# Patient Record
Sex: FEMALE | Employment: FULL TIME | URBAN - METROPOLITAN AREA
[De-identification: names, ages, dates, MRNs, and addresses within clinical notes are randomized per-mention and may not be internally consistent; named-entity substitution may affect disease eponyms.]

---

## 2024-08-26 ENCOUNTER — TELEPHONE (OUTPATIENT)
Dept: OBGYN | Facility: CLINIC | Age: 34
End: 2024-08-26

## 2024-08-26 NOTE — TELEPHONE ENCOUNTER
[facility]    Phone Message    May a detailed message be left on voicemail: yes     Reason for Call: Other: Pt scheduled initial prenatal visits. FRAN from [facility]. No prior c section. First available isnt till 10/8. PT only wanting seeing a MD. Please advise       Action Taken: Message routed to:  Other: ANDI HULL    Travel Screening: Not Applicable

## 2024-09-03 ENCOUNTER — TELEPHONE (OUTPATIENT)
Dept: OBGYN | Facility: CLINIC | Age: 34
End: 2024-09-03

## 2024-09-03 ENCOUNTER — VIRTUAL VISIT (OUTPATIENT)
Dept: OBGYN | Facility: CLINIC | Age: 34
End: 2024-09-03
Payer: COMMERCIAL

## 2024-09-03 VITALS — HEIGHT: 62 IN

## 2024-09-03 DIAGNOSIS — Z83.3 FAMILY HISTORY OF DIABETES MELLITUS: ICD-10-CM

## 2024-09-03 DIAGNOSIS — Z23 NEED FOR DIPHTHERIA-TETANUS-PERTUSSIS (TDAP) VACCINE: ICD-10-CM

## 2024-09-03 DIAGNOSIS — O09.519 ENCOUNTER FOR SUPERVISION OF PRIMIGRAVIDA OF ADVANCED MATERNAL AGE: Primary | ICD-10-CM

## 2024-09-03 PROBLEM — G89.29 CHRONIC PAIN OF RIGHT KNEE: Status: RESOLVED | Noted: 2023-07-26 | Resolved: 2024-09-03

## 2024-09-03 PROBLEM — N94.6 DYSMENORRHEA: Status: ACTIVE | Noted: 2022-10-13

## 2024-09-03 PROBLEM — M25.561 CHRONIC PAIN OF RIGHT KNEE: Status: ACTIVE | Noted: 2023-07-26

## 2024-09-03 PROBLEM — N94.6 DYSMENORRHEA: Status: RESOLVED | Noted: 2022-10-13 | Resolved: 2024-09-03

## 2024-09-03 PROBLEM — G89.29 CHRONIC PAIN OF RIGHT KNEE: Status: ACTIVE | Noted: 2023-07-26

## 2024-09-03 PROBLEM — M25.561 CHRONIC PAIN OF RIGHT KNEE: Status: RESOLVED | Noted: 2023-07-26 | Resolved: 2024-09-03

## 2024-09-03 PROCEDURE — 99207 PR NO CHARGE NURSE ONLY: CPT | Mod: 93

## 2024-09-03 RX ORDER — KETOCONAZOLE 20 MG/ML
SHAMPOO TOPICAL
COMMUNITY
Start: 2023-06-26

## 2024-09-03 RX ORDER — VITAMIN A ACETATE, BETA CAROTENE, ASCORBIC ACID, CHOLECALCIFEROL, .ALPHA.-TOCOPHEROL ACETATE, DL-, THIAMINE MONONITRATE, RIBOFLAVIN, NIACINAMIDE, PYRIDOXINE HYDROCHLORIDE, FOLIC ACID, CYANOCOBALAMIN, CALCIUM CARBONATE, FERROUS FUMARATE, ZINC OXIDE, CUPRIC OXIDE 3080; 12; 120; 400; 1; 1.84; 3; 20; 22; 920; 25; 200; 27; 10; 2 [IU]/1; UG/1; MG/1; [IU]/1; MG/1; MG/1; MG/1; MG/1; MG/1; [IU]/1; MG/1; MG/1; MG/1; MG/1; MG/1
1 TABLET, FILM COATED ORAL DAILY
COMMUNITY

## 2024-09-03 NOTE — TELEPHONE ENCOUNTER
[facility]    Phone Message    May a detailed message be left on voicemail: yes     Reason for Call: Other: Pt is wanting to cancel her appt on 9/23 as she is going to [facility] for the initial OB Appt, per protocol she is still needing that appt. Pt is also wanting to get set up to tour the [facility]? Are we able to do so. Please advise.      Action Taken: Other: [facility]    Travel Screening: Not Applicable     Date of Service: 9/03/24

## 2024-09-03 NOTE — PROGRESS NOTES
Important Information for Provider:     New ob nurse intake by phone, first pregnancy,AMA. Patient is a transfer from [facility], she is up to date with her prenatal care. Ultrasound performed 8/12/2024, comparable to LMP.  Genetic screening was done last week( results are not in the chart yet). ANNE MARIE with VALERIO 9/23/2024         Prenatal OB Questionnaire  Patient supplied answers from flow sheet for:  Prenatal OB Questionnaire.  Past Medical History  Have you ever received care for your mental health? : No  Have you ever been in a major accident or suffered serious trauma?: No  Within the last year, has anyone hit, slapped, kicked or otherwise hurt you?: No  In the last year, has anyone forced you to have sex when you didn't want to?: No    Past Medical History 2   Have you ever received a blood transfusion?: No  Would you accept a blood transfusion if was medically recommended?: Yes  Does anyone in your home smoke?: No   Is your blood type Rh negative?: No  Have you ever ?: No  Have you been hospitalized for a nonsurgical reason excluding normal delivery?: No  Have you ever had an abnormal pap smear?: No    Past Medical History (Continued)  Do you have a history of abnormalities of the uterus?: No  Did your mother take WILL or any other hormones when she was pregnant with you?: No  Do you have any other problems we have not asked about which you feel may be important to this pregnancy?: No                     Allergies as of 9/3/2024:    Allergies as of 09/03/2024 - never reviewed   Allergen Reaction Noted    Erythromycin Nausea and Vomiting, Hives, Other (See Comments), and Rash 02/13/2018    Acetaminophen Other (See Comments), Nausea, and Palpitations 05/23/2017       Current medications are:  Current Outpatient Medications   Medication Sig Dispense Refill    ketoconazole (NIZORAL) 2 % external shampoo Apply to scalp and rinse off in 5 minutes. Use 2-3 times a week.      Prenatal Vit-Fe Fumarate-FA  (PRENATAL PLUS) 27-1 MG TABS Take 1 tablet by mouth daily.           Early ultrasound screening tool:    Does patient have irregular periods?  No  Did patient use hormonal birth control in the three months prior to positive urine pregnancy test? No  Is the patient breastfeeding?  No  Is the patient 10 weeks or greater at time of education visit?  No

## 2024-09-05 NOTE — TELEPHONE ENCOUNTER
TOBY 9/5 SH    Per protocol, even if pt is doing her first appointment at another clinic, her first visit with us needs to be the length of a first prenatal. If she is already scheduled to see [facility] for her first visit, we would need the records from her care there transferred to us (fax number [phone]).    If pt would still like to cancel her visit on 9/23, there is a spot on hold for 30 minutes with SK on the day she is currently scheduled to see her if it works. Otherwise, please help pt find the first 30 minute appt in that week that works for her.

## 2024-09-10 NOTE — TELEPHONE ENCOUNTER
Called pt, who is okay with doing the 9/23 appt for 40 minutes. Records in Care Everywhere, but gave fax number just in case. 9/10 SH

## 2024-09-23 ENCOUNTER — TRANSCRIBE ORDERS (OUTPATIENT)
Dept: MATERNAL FETAL MEDICINE | Facility: CLINIC | Age: 34
End: 2024-09-23

## 2024-09-23 ENCOUNTER — PRENATAL OFFICE VISIT (OUTPATIENT)
Dept: MIDWIFE SERVICES | Facility: CLINIC | Age: 34
End: 2024-09-23
Payer: COMMERCIAL

## 2024-09-23 VITALS
OXYGEN SATURATION: 99 % | HEART RATE: 104 BPM | WEIGHT: 101.7 LBS | DIASTOLIC BLOOD PRESSURE: 75 MMHG | SYSTOLIC BLOOD PRESSURE: 109 MMHG | BODY MASS INDEX: 18.6 KG/M2

## 2024-09-23 DIAGNOSIS — O09.511 AMA (ADVANCED MATERNAL AGE) PRIMIGRAVIDA 35+, FIRST TRIMESTER: Primary | ICD-10-CM

## 2024-09-23 DIAGNOSIS — O26.90 PREGNANCY RELATED CONDITION, ANTEPARTUM: Primary | ICD-10-CM

## 2024-09-23 PROCEDURE — 99203 OFFICE O/P NEW LOW 30 MIN: CPT | Performed by: ADVANCED PRACTICE MIDWIFE

## 2024-09-23 ASSESSMENT — EDINBURGH POSTNATAL DEPRESSION SCALE (EPDS)
THINGS HAVE BEEN GETTING ON TOP OF ME: NO, I HAVE BEEN COPING AS WELL AS EVER
TOTAL SCORE: 0
I HAVE BEEN SO UNHAPPY THAT I HAVE HAD DIFFICULTY SLEEPING: NOT AT ALL
I HAVE LOOKED FORWARD WITH ENJOYMENT TO THINGS: AS MUCH AS I EVER DID
THE THOUGHT OF HARMING MYSELF HAS OCCURRED TO ME: NEVER
I HAVE BLAMED MYSELF UNNECESSARILY WHEN THINGS WENT WRONG: NO, NEVER
I HAVE BEEN SO UNHAPPY THAT I HAVE BEEN CRYING: NO, NEVER
I HAVE BEEN ANXIOUS OR WORRIED FOR NO GOOD REASON: NO, NOT AT ALL
I HAVE BEEN ABLE TO LAUGH AND SEE THE FUNNY SIDE OF THINGS: AS MUCH AS I ALWAYS COULD
I HAVE FELT SCARED OR PANICKY FOR NO GOOD REASON: NO, NOT AT ALL
I HAVE FELT SAD OR MISERABLE: NO, NOT AT ALL

## 2024-09-23 NOTE — PROGRESS NOTES
is a partnered  1 para 0 0 0 0  with an EDC by ultrasound of 3/28/25 who presents for a new OB Visit.   She has not had bleeding since her LMP.  She has had mild nausea.. Weigh loss   has not occurred.. She denies fever, chills and viral infections since her last LMP.  There is moderatefatigue. She is not a previous CNM patient.    =========================================    INFECTION HISTORY  HIV: no  Hepatitis B: no  Hepatitis C: no  Tuberculosis: no    Herpes self: no  Herpes partner:  no   Chlamydia: no, Neg testing at [facility]  Gonorrhea: no, Neg testing at [facility]   Trichomonis: no  Syphilis:  no  ============================================    Low Dose Aspirin for Preeclampsia Prevention  Assessment:    Consider for those with 1 high risk or 2  moderate risk factors         High risk: previous pregnancy with preeclampsia, multifetal gestation, chronic htn, diabetes, chronic kidney disease, autoimmune disorder         Medium risk: nulliparity, BMI >30, family hx preeclampsia, age >/= 35,  race, low SES, personal risk factors (hx low birth weight, hx stillbirth, >10yrs between pregnancies).         Patient does qualify for low dose aspirin therapy    PERSONAL/SOCIAL HISTORY  Lives lives with their family.  Employment: Full time.=============================================    REVIEW OF SYSTEMS  CONSTITUTIONAL: Denies fever, chills and night sweats  DIET: Appetite is decrease.  Eats a regular.   Plans to gain 25-35 pounds with her pregnancy.  SKIN: Denies changes and suspicious moles or lesions.  ENT: Denies blurred vision, hearing loss, tinnitus, frequent colds, epistaxis, hoarseness and tooth pain.    ENDOCRINE: Denies heat and cold intolerance, and polydypsia.    BREASTS:  Tender since LMP.  Denies discharge and lumps.   HEART/LUNGS: Denies dyspnea, wheezing, coughing and chest pain.  HEMATOLOGIC: Denies tendency to bruise and history of blood clots.  GASTROINTESTINAL: Denies heartburn,  indigestion and change of color in stools.    GENITOURINARY:  Denies urgency, dysuria and hematuria.  Has frequency of urination since LMP.   NEUROLOGIC:  Denies seizures, weakness and fainting.  PSYCHIATRIC:  Denies mood changes  ===========================================    PHYSICAL EXAM  GENERAL:   pleasant pregnant female, alert, cooperative  and well groomed.  SKIN:  Warm and dry, without lesions or tenderness.    HEAD: Symmetrical features.  EYES:  PERRLAEARS: Tympanic membranes gray, translucent and intact.  NOSE: No flaring, patent  MOUTH:  Buccal mucosa pink, moist without lesions.  Teeth in good repair.    NECK:  Thyroid without enlargement and nodules.  Lymph nodes not palpable.   LUNGS:  Clear to auscultation.  BREAST:  HEART:  RRR without murmur.  ABDOMEN: Soft without masses or tenderness.  No CVA tenderness.  Uterus palpable at size equal to dates. MUSCULOSKELETAL:  Full range of motion  GENITALIA: EXTREMITIES:  2+/2+ DTR, No edema. No significant varicosities.  ===================================================    PLAN:  Instructed on use of triage nurse line and contacting the on call CNM after hours in an emergency.    Discussed the indications, uses for and false positives for genetic testing and fetal survey ultrasounds at 18-20 weeks gestation. She had NIPT at previous provider.  [facility] US explained and ordered due to AMA.  Daily low dose ASA recommended.  Flu shot recommended and declined today.    Will return to the clinic in 4 weeks for her next routine prenatal check.  Will call to be seen sooner if problem arise.  Oriented to CNM Service and added to list.        Adelita Diez, DNP, APRN, CNM

## 2024-10-06 ENCOUNTER — HEALTH MAINTENANCE LETTER (OUTPATIENT)
Age: 34
End: 2024-10-06

## 2024-10-31 ENCOUNTER — PRE VISIT (OUTPATIENT)
Dept: MATERNAL FETAL MEDICINE | Facility: CLINIC | Age: 34
End: 2024-10-31
Payer: COMMERCIAL

## 2024-11-06 ENCOUNTER — HOSPITAL ENCOUNTER (OUTPATIENT)
Dept: ULTRASOUND IMAGING | Facility: CLINIC | Age: 34
Discharge: HOME | End: 2024-11-06
Attending: OBSTETRICS & GYNECOLOGY
Payer: COMMERCIAL

## 2024-11-06 ENCOUNTER — OFFICE VISIT (OUTPATIENT)
Dept: MATERNAL FETAL MEDICINE | Facility: CLINIC | Age: 34
End: 2024-11-06
Attending: OBSTETRICS & GYNECOLOGY
Payer: COMMERCIAL

## 2024-11-06 DIAGNOSIS — O26.90 PREGNANCY RELATED CONDITION, ANTEPARTUM: ICD-10-CM

## 2024-11-06 DIAGNOSIS — O09.512 PRIMIGRAVIDA OF ADVANCED MATERNAL AGE IN SECOND TRIMESTER: Primary | ICD-10-CM

## 2024-11-06 PROCEDURE — 99204 OFFICE O/P NEW MOD 45 MIN: CPT | Mod: 25 | Performed by: OBSTETRICS & GYNECOLOGY

## 2024-11-06 PROCEDURE — 76811 OB US DETAILED SNGL FETUS: CPT

## 2024-11-06 PROCEDURE — 76811 OB US DETAILED SNGL FETUS: CPT | Mod: 26 | Performed by: OBSTETRICS & GYNECOLOGY

## 2024-11-06 NOTE — NURSING NOTE
Patient reports positive fetal movement, no pain, no contractions, leaking of fluid, or bleeding.  SBAR given to DAMIAN ESCOBAR, see their note in Epic.

## 2024-11-06 NOTE — PROGRESS NOTES
The patient was seen for an ultrasound in the M[facility]Fetal Medicine Center at the [facility] today.  For a detailed report of the ultrasound examination, please see the ultrasound report which can be found under the imaging tab.    If you have questions regarding today's evaluation or if we can be of further service, please contact the Maternal-Fetal Medicine Center.    Rasheeda Carranza MD  , OB/GYN  Maternal-Fetal Medicine  [phone] (Pager)

## 2024-11-11 ENCOUNTER — PRENATAL OFFICE VISIT (OUTPATIENT)
Dept: OBGYN | Facility: CLINIC | Age: 34
End: 2024-11-11
Payer: COMMERCIAL

## 2024-11-11 VITALS
DIASTOLIC BLOOD PRESSURE: 57 MMHG | TEMPERATURE: 97.6 F | HEART RATE: 86 BPM | RESPIRATION RATE: 16 BRPM | WEIGHT: 104.6 LBS | OXYGEN SATURATION: 100 % | BODY MASS INDEX: 19.13 KG/M2 | SYSTOLIC BLOOD PRESSURE: 105 MMHG

## 2024-11-11 DIAGNOSIS — O09.512 ENCOUNTER FOR SUPERVISION OF PRIMIGRAVIDA OF ADVANCED MATERNAL AGE IN SECOND TRIMESTER: Primary | ICD-10-CM

## 2024-11-11 PROCEDURE — 99207 PR PRENATAL VISIT: CPT | Performed by: OBSTETRICS & GYNECOLOGY

## 2024-11-11 RX ORDER — ASPIRIN 81 MG/1
81 TABLET ORAL DAILY
Qty: 100 TABLET | Refills: 4 | Status: SHIPPED | OUTPATIENT
Start: 2024-11-11

## 2024-11-11 RX ORDER — FAMOTIDINE 20 MG/1
20 TABLET, FILM COATED ORAL
COMMUNITY
Start: 2024-10-04 | End: 2025-02-01

## 2024-11-11 RX ORDER — METRONIDAZOLE 500 MG/1
TABLET ORAL
COMMUNITY
Start: 2024-10-03

## 2024-11-26 ENCOUNTER — MYC MEDICAL ADVICE (OUTPATIENT)
Dept: OBGYN | Facility: CLINIC | Age: 34
End: 2024-11-26
Payer: COMMERCIAL

## 2024-12-02 NOTE — TELEPHONE ENCOUNTER
Pt called - discussed with pt to either call primary to be assessed - pain in mid back sound skeletal muscular.  She could also be experiencing indigestion and to start taking her her acid reflux medication as prescribed.  Pt verbalized understanding and agreed to the plan.

## 2024-12-12 ENCOUNTER — VIRTUAL VISIT (OUTPATIENT)
Dept: OBGYN | Facility: CLINIC | Age: 34
End: 2024-12-12
Attending: ADVANCED PRACTICE MIDWIFE
Payer: COMMERCIAL

## 2024-12-12 DIAGNOSIS — O09.512 ENCOUNTER FOR SUPERVISION OF PRIMIGRAVIDA OF ADVANCED MATERNAL AGE IN SECOND TRIMESTER: Primary | ICD-10-CM

## 2024-12-12 LAB
ABO + RH BLD: NORMAL
BLD GP AB SCN SERPL QL: NEGATIVE
SPECIMEN EXP DATE BLD: NORMAL

## 2024-12-12 RX ORDER — VITAMIN B COMPLEX
1 TABLET ORAL DAILY
COMMUNITY

## 2024-12-12 RX ORDER — VITAMIN E (DL,TOCOPHERYL ACET) 180 MG
1 CAPSULE ORAL DAILY
COMMUNITY

## 2024-12-12 ASSESSMENT — EDINBURGH POSTNATAL DEPRESSION SCALE (EPDS)
I HAVE BEEN SO UNHAPPY THAT I HAVE BEEN CRYING: ONLY OCCASIONALLY
I HAVE BLAMED MYSELF UNNECESSARILY WHEN THINGS WENT WRONG: YES, SOME OF THE TIME
I HAVE BEEN ABLE TO LAUGH AND SEE THE FUNNY SIDE OF THINGS: AS MUCH AS I ALWAYS COULD
I HAVE FELT SAD OR MISERABLE: NOT VERY OFTEN
I HAVE BEEN SO UNHAPPY THAT I HAVE HAD DIFFICULTY SLEEPING: NOT VERY OFTEN
THE THOUGHT OF HARMING MYSELF HAS OCCURRED TO ME: NEVER
I HAVE LOOKED FORWARD WITH ENJOYMENT TO THINGS: AS MUCH AS I EVER DID
I HAVE BEEN ANXIOUS OR WORRIED FOR NO GOOD REASON: YES, SOMETIMES
THINGS HAVE BEEN GETTING ON TOP OF ME: NO, MOST OF THE TIME I HAVE COPED QUITE WELL
I HAVE FELT SCARED OR PANICKY FOR NO GOOD REASON: YES, SOMETIMES
TOTAL SCORE: 10

## 2024-12-12 NOTE — PROGRESS NOTES
[facility] RN Transfer of Care Intake note    34 year old female newly pregnant.  LMP: 24.    exact and regular cycles  Pregnancy is unplanned but welcomed.    [facility] floor and has had 8 prenatal visits with this clinic. Gestational age at first OB visit with this pregnancy was 7th week    The reason the patient is transferring care is: Moved closer to [place].    Partner/support person name and relationship - Herber, partner.        Symptoms since LMP include constipation. Patient has tried these relief   measures: increased fluids.      OB HISTORY  OB History    Para Term  AB Living   1 0 0 0 0 0   SAB IAB Ectopic Multiple Live Births   0 0 0 0 0      # Outcome Date GA Lbr Uday/2nd Weight Sex Type Anes PTL Lv   1 Current               Obstetric Comments   *First Pregnancy           OB COMPLICATIONS  *First Pregnancy       PERSONAL/SOCIAL HISTORY    lives with their spouse.  Employment: Full time as a coordinator.  Job involves light activity .  Her partner works as a consultant.     MENTAL HEALTH HISTORY:  denies      - Current Medications    Current Outpatient Medications   Medication Sig Dispense Refill    aspirin 81 MG EC tablet Take 1 tablet (81 mg) by mouth daily. 100 tablet 4    Magnesium Oxide -Mg Supplement 500 MG CAPS Take 1 tablet by mouth daily.      Prenatal Vit-Fe Fumarate-FA (PRENATAL PLUS) 27-1 MG TABS Take 1 tablet by mouth daily.      Vitamin D3 (VITAMIN D, CHOLECALCIFEROL,) 25 mcg (1000 units) tablet Take 1 tablet by mouth daily.      famotidine (PEPCID) 20 MG tablet Take 20 mg by mouth. (Patient not taking: Reported on 2024)      metroNIDAZOLE (FLAGYL) 500 MG tablet  (Patient not taking: Reported on 2024)             - Co-morbids  History reviewed. No pertinent past medical history.    - Pre pregnancy weight 103lbs  pre pregnancy BMI     - Genetic/Infection questionnaire completed, risks include none. Discussed  genetic screening options, patient does desire, did screen, WNL first trimester genetic screening  Pt  does not have a recent known exposure to Parvo or CMV so IgG/IgM testing WILL NOT be ordered.   - Labs already drawn this pregnancy include: all standard labs  - Ultrasound done at 20 wks weeks gestation on 11/6/24  Flu Vaccine.  Wants vaccine  COVID Vaccine: Hx of COVID illness  (details below), received vaccines  RHogam: no Rhogam, O+  Tdap: wants TDAP  - Discussed expectations for routine prenatal care and scheduling.  - Discussed highlights from The Expectant Family booklet on warning signs, safe pregnancy and prevention of infections diseases, nutrition and exercise.  - Patient was encouraged to start prenatal vitamins as tolerated, if experiencing nausea and vomiting then OK to switch to folic acid only at this time.    - Additional items: Has been given information regarding WIC  Was given the phone number for [facility]  - Reconciled and reviewed problem list  - Pt scheduled for NOB on 12/13/24 at 10:40 am with VALERIO Tadeo RN

## 2024-12-12 NOTE — PATIENT INSTRUCTIONS
"Weeks 22 to 26 of Your Pregnancy: Care Instructions  Your baby's lungs are getting ready for breathing. Your baby may respond to your voice. Your baby likely turns less, and kicks or jerks more. Jerking may mean that your baby has hiccups.    Think about taking childbirth classes. And start to think about whether you want to have pain medicine during labor.   At your next doctor visit, you may be tested for anemia and for high blood sugar that first occurs during pregnancy (gestational diabetes). These conditions can cause problems for you and your baby.         To ease discomfort, such as back pain   Change your position often. Try not to sit or stand for too long.  Get some exercise. Things like walking or stretching may help.  Try using a heating pad or cold pack.        To ease or reduce swelling in your feet, ankles, hands, and fingers   Take off your rings.  Avoid high-sodium foods, such as potato chips.  Prop up your feet, and sleep with pillows under your feet.  Try to avoid standing for long periods of time.  Do not wear tight shoes.  Wear support stockings.  Kegel exercises to prevent urine from leaking    Squeeze your muscles as if you were trying not to pass gas. Your belly, legs, and buttocks shouldn't move. Hold the squeeze for 3 seconds, then relax for 5 to 10 seconds.    Add 1 second each week until you can squeeze for 10 seconds. Repeat the exercise 10 times a session. Do 3 to 8 sessions a day. If these exercises cause you pain, stop doing them and talk with your doctor.  Follow-up care is a key part of your treatment and safety. Be sure to make and go to all appointments, and call your doctor if you are having problems. It's also a good idea to know your test results and keep a list of the medicines you take.  Where can you learn more?  Go to https://www.healthwise.net/patiented  Enter G264 in the search box to learn more about \"Weeks 22 to 26 of Your Pregnancy: Care Instructions.\"  Current as of: " July 10, 2023  Content Version: 14.2 2024 [email or web address] [employer].   Care instructions adapted under license by your healthcare professional. If you have questions about a medical condition or this instruction, always ask your healthcare professional. Healthwise, Incorporated disclaims any warranty or liability for your use of this information.    Learning About Screening for Gestational Diabetes  What is gestational diabetes screening?     Screening for gestational diabetes is a way to look for high blood sugar during pregnancy. You drink some very sweet liquid. Then you have a blood test to see how your body uses sugar (glucose).  How is gestational diabetes screening done?  Screening for gestational diabetes may be done in a couple of ways.  Two-part screening.  Part one (glucose challenge test): A blood sample is taken after you drink a liquid that contains sugar (glucose). You don't need to stop eating or drinking before this test. If the test shows that you don't have a lot of sugar in your blood, you don't have gestational diabetes.  Part two (oral glucose tolerance test, or OGTT): If the first test shows a lot of sugar in your blood, then you may have an OGTT. You can't eat or drink for at least 8 hours before this test. A blood sample is taken, then you drink a sweet liquid. You have more blood tests after 1 to 3 hours. If the OGTT shows that you have a lot of sugar in your blood, you may have gestational diabetes.  One-part screening.  Sometimes doctors use the OGTT on its own. If the test shows that you don't have a lot of sugar in your blood, you don't have gestational diabetes. If you do have a lot of sugar in your blood, you may have the condition.  What are the risks of screening?  Your blood glucose level may drop very low toward the end of the test. If this happens, you may feel weak, hungry, and restless. Tell your doctor if you have these symptoms. The test usually will be stopped.  You may  "vomit after drinking the sweet liquid. If this happens, you may need to take the test at a later time.  Your doctor may do more glucose tests at other times during your pregnancy.  Follow-up care is a key part of your treatment and safety. Be sure to make and go to all appointments, and call your doctor if you are having problems. It's also a good idea to know your test results and keep a list of the medicines you take.  Where can you learn more?  Go to https://www.Science.net/patiented  Enter A472 in the search box to learn more about \"Learning About Screening for Gestational Diabetes.\"  Current as of: 2023  Content Version: 2024 [employer].   Care instructions adapted under license by your healthcare professional. If you have questions about a medical condition or this instruction, always ask your healthcare professional. Healthwise, Incorporated disclaims any warranty or liability for your use of this information.    You have been provided the My Labor and Birth Wishes document.  Please review at home and bring to your next prenatal visit. Bring this sheet to the hospital for your birth. Give copies to your care team members and support person.   Additional copies can be found here:  [email or web address]  Weeks 26 to 30 of Your Pregnancy: Care Instructions  You're starting your last trimester. You'll probably feel your baby moving around more. Your back may ache as your body gets used to your baby's size and length. Take care of yourself, and pay attention to what your body needs.    Talk to your doctor about getting the Tdap shot. It will help protect your  against whooping cough (pertussis). Also ask your doctor about flu and COVID-19 shots if you haven't had them yet. If your blood type is Rh negative, you may be given a shot of Rh immune globulin (such as RhoGAM). It can help prevent problems for your baby.   You may have Manuel-Levin contractions. They are " "single or several strong contractions without a pattern. These are practice contractions but not the start of labor.   Be kind to yourself.       Take breaks when you're tired.  Change positions often. Don't sit for too long or stand for too long.  At work, rest during breaks if you can. If you don't get breaks, talk to your doctor about writing a letter to your employer to request them.  Avoid fumes, chemicals, and tobacco smoke.  Be sexual if you want to.       You may be interested in sex, or you may not. Everyone is different.  Sex is okay unless your doctor tells you not to.  Your belly can make it hard to find good positions for sex. [place] and explore.  Watch for signs of  labor.        These signs include:  Menstrual-like cramps. Or you may have pain or pressure in your pelvis that happens in a pattern.  About 6 or more contractions in an hour (even after rest and a glass of water).  A low, dull backache that doesn't go away when you change positions.  An increase or change in vaginal discharge.  Light vaginal bleeding or spotting.  Your water breaking.  Know what to do if you think you are having contractions.       Drink 1 or 2 glasses of water.  Lie down on your left side for at least an hour.  While on your side, feel the top of your belly to see if it's tight.  Write down your contractions for an hour. Time how long it is from the start of one contraction to the start of the next.  Call your doctor if you have regular contractions.  Follow-up care is a key part of your treatment and safety. Be sure to make and go to all appointments, and call your doctor if you are having problems. It's also a good idea to know your test results and keep a list of the medicines you take.  Where can you learn more?  Go to https://www.healthwise.net/patiented  Enter S999 in the search box to learn more about \"Weeks 26 to 30 of Your Pregnancy: Care Instructions.\"  Current as of: July 10, 2023  Content Version: " 14.2    2024 [facility] [employer].   Care instructions adapted under license by your healthcare professional. If you have questions about a medical condition or this instruction, always ask your healthcare professional. Healthwise, Incorporated disclaims any warranty or liability for your use of this information.    Counting Your Baby's Kicks: Care Instructions  Overview     Counting your baby's kicks is one way your doctor can tell that your baby is healthy. You will probably feel your baby move for the first time between 16 and 22 weeks. The movement may feel like flutters rather than kicks. Your baby may move more at certain times of the day. When you are active, you may notice less kicking than when you are resting. At your prenatal visits, your doctor will ask whether the baby is active.  In your last trimester, your doctor may ask you to count the number of times you feel your baby move.  Follow-up care is a key part of your treatment and safety. Be sure to make and go to all appointments, and call your doctor if you are having problems. It's also a good idea to know your test results and keep a list of the medicines you take.  How do you count fetal kicks?  A common method of checking your baby's movement is to note the length of time it takes to count 10 movements (such as kicks, flutters, or rolls).  Pick your baby's most active time of day to count. This may be any time from morning to evening.  If you don't feel 10 movements in an hour, have something to eat or drink and count for another hour. If you don't feel at least 10 movements in the 2-hour period, call your doctor.  Do not use an at-home Doppler heart monitor in place of counting fetal movements.  When should you call for help?   Call your doctor now or seek immediate medical care if:    You feel fewer than 10 movements in a 2-hour period.     You noticed that your baby has stopped moving or is moving less than normal.   Watch closely for  "changes in your health, and be sure to contact your doctor if you have any problems.  Where can you learn more?  Go to https://www.[employer].net/patiented  Enter U048 in the search box to learn more about \"Counting Your Baby's Kicks: Care Instructions.\"  Current as of: July 10, 2023  Content Version: 14.2 2024 [facility] [employer].   Care instructions adapted under license by your healthcare professional. If you have questions about a medical condition or this instruction, always ask your healthcare professional. Healthwise, Incorporated disclaims any warranty or liability for your use of this information.      "

## 2024-12-12 NOTE — PATIENT INSTRUCTIONS
Thank you for trusting us with your care!     Please be aware, if you are on Mychart, you may see your results prior to your providers review. If labs are abnormal, we will call or message you on Mychart with a follow up plan.    If you need to contact us for questions about:  Symptoms, Scheduling & Medical Questions;   -Non-urgent (2-3 day response) Mychart message   -Urgent (needing response today) [phone] (if after 3:30pm next day response)     Prescriptions: Please call your Pharmacy   Billing: Catherine [phone]       or                           [facility]:[phone]           [facility] NUMBER: [phone]     APPOINTMENTS:  8-28 weeks every 4 weeks  28-36 weeks every 2 weeks  36-Delivery every week    ULTRASOUNDS AND TESTS:  You will have an 18-20 week ultrasound to check the growth and anatomy of your baby. Additional ultrasounds will be done if medically indicated.         The [place] Cold:  Rest and drink plenty of fluids.  A vaporizer may help.    For aches and fever  Acetaminophen (Tylenol)    For Sore Throat  Gargle with warm salt water. Suck on hard candy, ice or popsicles. Eat soft, soothing foods. Drink cool or warm liquids.  You may also take:    Cough drops, such as Halls, Ricola, Cepacol or Chloraseptic.  Acetaminophen    For Cough  Avoid products that contain alcohol.  You may take:    Cough drops, such as Halls, Ricola, Cepacol or Chloraseptic.  Guaifenesin (Mucinex, plain Robitussin) for dry cough.   Dextromethorphan (plain Robitussin, Delsym) to suppress a cough.    For nasal congestion (stuffy head)  You may take:  Saline nasal spray  Afrin nasal spray. Do not use this for more than 3 or 4 days.  Pseudoephedrine (plain Sudafed). Use with caution and only after consulting your care provider.  Do not use this if you have high blood pressure.    Allergies (such as runny nose or sneezing)  Diphenhydramine (plain Benadryl)  Chlorpheniramine (Chlor-Trimeton)  Second  generation antihistamines such as Claritin (loratadine), or Zyrtec (cetirizine).    Flu (influenza) prevention  Pregnant women should be vaccinated early in the flu season (October through May) as soon as the vaccine is available regardless how far along you are in your pregnancy, (Do not use the FluMist nasal inhalation).    Headaches, pain and inflammation  Do not take ibuprofen (Advil or Motrin), naproxen sodium (Aleve), aspirin or salicylates without first speaking with your provider.  These may not be safe during all stages or pregnancy.  Instead, you may use:  Acetaminophen (Tylenol).  If Tylenol does not help your headache, call your care provider.  This could be a sign of high blood pressure.    Constipation  (hard dry stools that are hard to pass)    Eat more fiber such as whole grains, fruits, and vegetables. Drink 10-12 glasses of fluids each day. Avoid caffeine.  You may also take:  Docusate sodium (Colace) to soften your stools. This takes a day or two to have an effect.  Metamucil (plain), Effersyllium, Citrucel, or Fibercon to increase the fiber in your diet. This takes a day or two to have an effect  Miralax. This may take a day or two to have an effect.  Senna (Senokot) or bisacodyl (Dulcolax). This will produce a bowel movement soon after use. Do not use this regularly  Glycerin suppository. This will produce a bowel movement soon after use.  Milk of Magnesia for easier bowel movements. This works overnight. Do not use it regularly.     Nausea in Pregnancy      -Small frequent meals, dry crackers/toast before rising, avoid fatty foods, increase protein intake, avoid triggers (smells), increase sleep, jose (tea, chewable candy, tablet). If you're taking a prenatal vitamin and feel like that could be causing some nausea, you could switch to Folic Acid only.      -If the above are not helpful:         * you can take Vitamin B6 (Pyridoxine). Take 10-25 mg every 8 hours.          * you can take  Doxylamine (Unisom tablets) (NOT sleep gels). Take 12.5 mg every 4 hours. You may take            25 mg at bedtime        Please be seen in the Emergency Department if:     -unable to keep fluids down x24 hours or more  -no urine output for 8 or more hours during waking hours  -uterine cramping  -severe abdominal pain OR bloody diarrhea   -signs/symptoms of dehydration (tacky mucous membranes, dizzy/lightheaded, increase heart rate, shortness of breath)    WIC is a nutrition and breastfeeding program that helps young families eat well and be healthy by assisting with obtaining food and teaching about nutritious foods during/after pregnancy.  Here's where you can find more information about WIC and apply if interested: [email or web address]      [employer] provides education during all three trimesters of pregnancy, reviewing the stages of prenatal development so parents know what to expect and feel empowered in their journey. They also continue support after birth, with [employer] Educators modeling techniques for playing and learning. Here's the link: https://Velti/enroll/      CONTACT THE CLINIC IF YOU HAVE:  -Temperature of 100.4 or above  -Irritating vaginal discharge (increase in non-irritating discharge is normal)  -Vaginal bleeding/severe pain  -Bladder infection (burning with urination, frequent urination, blood in urine)  -Headaches not relieved with rest, hydration, and/or Tylenol  -Decrease in baby's activity. Kick counts <28 weeks   -Any accident resulting in trauma (injury) to you-especially abdominal trauma  -Symptoms of pre-term labor      Weeks 22 to 26 of Your Pregnancy: Care Instructions  Your baby's lungs are getting ready for breathing. Your baby may respond to your voice. Your baby likely turns less, and kicks or jerks more. Jerking may mean that your baby has hiccups.    Think about taking childbirth classes. And start to think about whether you want to  "have pain medicine during labor.   At your next doctor visit, you may be tested for anemia and for high blood sugar that first occurs during pregnancy (gestational diabetes). These conditions can cause problems for you and your baby.         To ease discomfort, such as back pain   Change your position often. Try not to sit or stand for too long.  Get some exercise. Things like walking or stretching may help.  Try using a heating pad or cold pack.        To ease or reduce swelling in your feet, ankles, hands, and fingers   Take off your rings.  Avoid high-sodium foods, such as potato chips.  Prop up your feet, and sleep with pillows under your feet.  Try to avoid standing for long periods of time.  Do not wear tight shoes.  Wear support stockings.  Kegel exercises to prevent urine from leaking    Squeeze your muscles as if you were trying not to pass gas. Your belly, legs, and buttocks shouldn't move. Hold the squeeze for 3 seconds, then relax for 5 to 10 seconds.    Add 1 second each week until you can squeeze for 10 seconds. Repeat the exercise 10 times a session. Do 3 to 8 sessions a day. If these exercises cause you pain, stop doing them and talk with your doctor.  Follow-up care is a key part of your treatment and safety. Be sure to make and go to all appointments, and call your doctor if you are having problems. It's also a good idea to know your test results and keep a list of the medicines you take.  Where can you learn more?  Go to https://www.Zipfit.net/patiented  Enter G264 in the search box to learn more about \"Weeks 22 to 26 of Your Pregnancy: Care Instructions.\"  Current as of: July 10, 2023  Content Version: 14.2 2024 [facility] [employer].   Care instructions adapted under license by your healthcare professional. If you have questions about a medical condition or this instruction, always ask your healthcare professional. Healthwise[employer] Incorporated disclaims any warranty or liability for your use " of this information.    Learning About Screening for Gestational Diabetes  What is gestational diabetes screening?     Screening for gestational diabetes is a way to look for high blood sugar during pregnancy. You drink some very sweet liquid. Then you have a blood test to see how your body uses sugar (glucose).  How is gestational diabetes screening done?  Screening for gestational diabetes may be done in a couple of ways.  Two-part screening.  Part one (glucose challenge test): A blood sample is taken after you drink a liquid that contains sugar (glucose). You don't need to stop eating or drinking before this test. If the test shows that you don't have a lot of sugar in your blood, you don't have gestational diabetes.  Part two (oral glucose tolerance test, or OGTT): If the first test shows a lot of sugar in your blood, then you may have an OGTT. You can't eat or drink for at least 8 hours before this test. A blood sample is taken, then you drink a sweet liquid. You have more blood tests after 1 to 3 hours. If the OGTT shows that you have a lot of sugar in your blood, you may have gestational diabetes.  One-part screening.  Sometimes doctors use the OGTT on its own. If the test shows that you don't have a lot of sugar in your blood, you don't have gestational diabetes. If you do have a lot of sugar in your blood, you may have the condition.  What are the risks of screening?  Your blood glucose level may drop very low toward the end of the test. If this happens, you may feel weak, hungry, and restless. Tell your doctor if you have these symptoms. The test usually will be stopped.  You may vomit after drinking the sweet liquid. If this happens, you may need to take the test at a later time.  Your doctor may do more glucose tests at other times during your pregnancy.  Follow-up care is a key part of your treatment and safety. Be sure to make and go to all appointments, and call your doctor if you are having problems.  "It's also a good idea to know your test results and keep a list of the medicines you take.  Where can you learn more?  Go to https://www.Focus.net/patiented  Enter A472 in the search box to learn more about \"Learning About Screening for Gestational Diabetes.\"  Current as of: October 2, 2023  Content Version: 14.2 2024 [facility] [employer].   Care instructions adapted under license by your healthcare professional. If you have questions about a medical condition or this instruction, always ask your healthcare professional. Healthwise[employer] Incorporated disclaims any warranty or liability for your use of this information.    You have been provided the My Labor and Birth Wishes document.  Please review at home and bring to your next prenatal visit. Bring this sheet to the hospital for your birth. Give copies to your care team members and support person.   Additional copies can be found here:  [email or web address] Office [email or web address]  "

## 2024-12-12 NOTE — LETTER
2024       RE: Yuly Marcelo  [id number] [address] [facility] [address]     Dear Colleague,    Thank you for referring your patient, Yuly Marcelo, to the [facility] [place] [facility]. Please see a copy of my visit note below.    [facility] RN Transfer of Care Intake note    34 year old female newly pregnant.  LMP: 24.    exact and regular cycles  Pregnancy is unplanned but welcomed.    Patient is transferring care from: [facility], then [address] [place] [address] and has had 8 prenatal visits with this clinic. Gestational age at first OB visit with this pregnancy was 7th week    The reason the patient is transferring care is: Moved closer to [place].    Partner/support person name and relationship - Herber, partner.        Symptoms since LMP include constipation. Patient has tried these relief   measures: increased fluids.      OB HISTORY  OB History    Para Term  AB Living   1 0 0 0 0 0   SAB IAB Ectopic Multiple Live Births   0 0 0 0 0      # Outcome Date GA Lbr Uday/2nd Weight Sex Type Anes PTL Lv   1 Current               Obstetric Comments   *First Pregnancy           OB COMPLICATIONS  *First Pregnancy       PERSONAL/SOCIAL HISTORY    lives with their spouse.  Employment: Full time as a coordinator.  Job involves light activity .  Her partner works as a consultant.     MENTAL HEALTH HISTORY:  denies      - Current Medications    Current Outpatient Medications   Medication Sig Dispense Refill     aspirin 81 MG EC tablet Take 1 tablet (81 mg) by mouth daily. 100 tablet 4     Magnesium Oxide -Mg Supplement 500 MG CAPS Take 1 tablet by mouth daily.       Prenatal Vit-Fe Fumarate-FA (PRENATAL PLUS) 27-1 MG TABS Take 1 tablet by mouth daily.       Vitamin D3 (VITAMIN D, CHOLECALCIFEROL,) 25 mcg (1000 units) tablet Take 1 tablet by mouth daily.       famotidine (PEPCID) 20 MG tablet Take 20 mg by mouth.  (Patient not taking: Reported on 12/12/2024)       metroNIDAZOLE (FLAGYL) 500 MG tablet  (Patient not taking: Reported on 12/12/2024)             - Co-morbids  History reviewed. No pertinent past medical history.    - Pre pregnancy weight 103lbs  pre pregnancy BMI     - Genetic/Infection questionnaire completed, risks include none. Discussed genetic screening options, patient does desire, did screen, WNL first trimester genetic screening  Pt  does not have a recent known exposure to Parvo or CMV so IgG/IgM testing WILL NOT be ordered.   - Labs already drawn this pregnancy include: all standard labs  - Ultrasound done at 20 wks weeks gestation on 11/6/24  Flu Vaccine.  Wants vaccine  COVID Vaccine: Hx of COVID illness  (details below), received vaccines  RHogam: no Rhogam, O+  Tdap: wants TDAP  - Discussed expectations for routine prenatal care and scheduling.  - Discussed highlights from The Expectant Family booklet on warning signs, safe pregnancy and prevention of infections diseases, nutrition and exercise.  - Patient was encouraged to start prenatal vitamins as tolerated, if experiencing nausea and vomiting then OK to switch to folic acid only at this time.    - Additional items: Has been given information regarding WIC  Was given the phone number for [employer]  - Reconciled and reviewed problem list  - Pt scheduled for NOB on 12/13/24 at 10:40 am with VALERIO Tadeo RN             Again, thank you for allowing me to participate in the care of your patient.      Sincerely,    [facility]

## 2024-12-13 ENCOUNTER — LAB (OUTPATIENT)
Dept: LAB | Facility: CLINIC | Age: 34
End: 2024-12-13
Attending: ADVANCED PRACTICE MIDWIFE
Payer: COMMERCIAL

## 2024-12-13 ENCOUNTER — PRENATAL OFFICE VISIT (OUTPATIENT)
Dept: OBGYN | Facility: CLINIC | Age: 34
End: 2024-12-13
Attending: ADVANCED PRACTICE MIDWIFE
Payer: COMMERCIAL

## 2024-12-13 VITALS
HEART RATE: 97 BPM | DIASTOLIC BLOOD PRESSURE: 77 MMHG | BODY MASS INDEX: 20.24 KG/M2 | SYSTOLIC BLOOD PRESSURE: 122 MMHG | WEIGHT: 110 LBS | HEIGHT: 62 IN

## 2024-12-13 DIAGNOSIS — Z83.3 FAMILY HISTORY OF DIABETES MELLITUS: ICD-10-CM

## 2024-12-13 DIAGNOSIS — M54.89 OTHER ACUTE BACK PAIN: ICD-10-CM

## 2024-12-13 DIAGNOSIS — O09.512 ENCOUNTER FOR SUPERVISION OF PRIMIGRAVIDA OF ADVANCED MATERNAL AGE IN SECOND TRIMESTER: ICD-10-CM

## 2024-12-13 DIAGNOSIS — O09.512 ENCOUNTER FOR SUPERVISION OF PRIMIGRAVIDA OF ADVANCED MATERNAL AGE IN SECOND TRIMESTER: Primary | ICD-10-CM

## 2024-12-13 LAB
ERYTHROCYTE [DISTWIDTH] IN BLOOD BY AUTOMATED COUNT: 12.8 % (ref 10–15)
GLUCOSE 1H P 50 G GLC PO SERPL-MCNC: 78 MG/DL (ref 70–129)
HBV SURFACE AB SERPL IA-ACNC: 53.3 M[IU]/ML
HBV SURFACE AB SERPL IA-ACNC: REACTIVE M[IU]/ML
HCT VFR BLD AUTO: 35.1 % (ref 35–47)
HGB BLD-MCNC: 12.2 G/DL (ref 11.7–15.7)
MCH RBC QN AUTO: 32.3 PG (ref 26.5–33)
MCHC RBC AUTO-ENTMCNC: 34.8 G/DL (ref 31.5–36.5)
MCV RBC AUTO: 93 FL (ref 78–100)
PLATELET # BLD AUTO: 233 10E3/UL (ref 150–450)
RBC # BLD AUTO: 3.78 10E6/UL (ref 3.8–5.2)
T PALLIDUM AB SER QL: NONREACTIVE
VIT D+METAB SERPL-MCNC: 34 NG/ML (ref 20–50)
VZV IGG SER QL IA: 0.09 S/CO
VZV IGG SER QL IA: NEGATIVE
WBC # BLD AUTO: 7.4 10E3/UL (ref 4–11)

## 2024-12-13 PROCEDURE — 85041 AUTOMATED RBC COUNT: CPT | Performed by: ADVANCED PRACTICE MIDWIFE

## 2024-12-13 PROCEDURE — 86706 HEP B SURFACE ANTIBODY: CPT | Performed by: ADVANCED PRACTICE MIDWIFE

## 2024-12-13 PROCEDURE — 86787 VARICELLA-ZOSTER ANTIBODY: CPT | Performed by: ADVANCED PRACTICE MIDWIFE

## 2024-12-13 PROCEDURE — 36415 COLL VENOUS BLD VENIPUNCTURE: CPT | Performed by: ADVANCED PRACTICE MIDWIFE

## 2024-12-13 PROCEDURE — 85018 HEMOGLOBIN: CPT | Performed by: ADVANCED PRACTICE MIDWIFE

## 2024-12-13 PROCEDURE — 82950 GLUCOSE TEST: CPT

## 2024-12-13 PROCEDURE — 87086 URINE CULTURE/COLONY COUNT: CPT | Performed by: ADVANCED PRACTICE MIDWIFE

## 2024-12-13 PROCEDURE — 250N000011 HC RX IP 250 OP 636

## 2024-12-13 PROCEDURE — 36415 COLL VENOUS BLD VENIPUNCTURE: CPT

## 2024-12-13 PROCEDURE — 86780 TREPONEMA PALLIDUM: CPT | Performed by: ADVANCED PRACTICE MIDWIFE

## 2024-12-13 PROCEDURE — 90656 IIV3 VACC NO PRSV 0.5 ML IM: CPT

## 2024-12-13 PROCEDURE — 85660 RBC SICKLE CELL TEST: CPT | Performed by: ADVANCED PRACTICE MIDWIFE

## 2024-12-13 PROCEDURE — G0008 ADMIN INFLUENZA VIRUS VAC: HCPCS

## 2024-12-13 PROCEDURE — 99213 OFFICE O/P EST LOW 20 MIN: CPT | Performed by: ADVANCED PRACTICE MIDWIFE

## 2024-12-13 PROCEDURE — 86900 BLOOD TYPING SEROLOGIC ABO: CPT | Performed by: ADVANCED PRACTICE MIDWIFE

## 2024-12-13 PROCEDURE — 82306 VITAMIN D 25 HYDROXY: CPT | Performed by: ADVANCED PRACTICE MIDWIFE

## 2024-12-13 NOTE — PROGRESS NOTES
"Transfer of Care  SUBJECTIVE  34 year old woman presents to clinic for transfer of OB care appointment.    Patient's last menstrual period was 2024.  at 25w0d by Estimated Date of Delivery: Mar 28, 2025 based on LMP.     - Feels well overall.   - Pt was receiving prenatal care from [facility].  Initiated prenatal care at 8 weeks, has had 6 visits total.      - Reason for transfer to [facility] care she recently moved and [place] is closer. Also had a friend deliver with our CNMs and had a very good experience.  - prenatal records available in Epic, reviewed   - After review of prenatal records, additional routine orders are recommended including abo/rh t&s, 1 hour glucose, CBC with plts, UC, anti trep, Varicella,  hepatitis b surface antibody, hemoglobin electrophoresis    -Level II Ultrasound reviewed, normal results, follow up as needed.  - Pre pregnancy BMI 18.8.   Pre Pregnancy Weight 103.  Height 5'2\".     Last US 24  1. Borden pregnancy at 19w 5d gestational age.  2. No fetal anomalies commonly detected by ultrasound were identified in the detailed fetal anatomic survey within the limits of prenatal ultrasound.  3. Growth parameters and estimated fetal weight were consistent with gestational age predicted by assigned BURAK.  4. The amniotic fluid volume appeared normal.  5. On transabdominal imaging the cervix appeared long and closed.    OTHER CONCERNS:   Having mid back pain, especially lying on right side or doing chores  Noticing some abdominal pain on and off that is relieved with positional changes.   Did have a headache, took tylenol and was relieved.   Prenatal classes.         - Current Medications    Current Outpatient Medications   Medication Sig Dispense Refill    aspirin 81 MG EC tablet Take 1 tablet (81 mg) by mouth daily. 100 tablet 4    Magnesium Oxide -Mg Supplement 500 MG CAPS Take 1 tablet by mouth every other day.      Prenatal Vit-Fe Fumarate-FA (PRENATAL PLUS) 27-1 MG TABS " Take 1 tablet by mouth daily.      Vitamin D3 (VITAMIN D, CHOLECALCIFEROL,) 25 mcg (1000 units) tablet Take 1 tablet by mouth daily.      famotidine (PEPCID) 20 MG tablet Take 20 mg by mouth. (Patient not taking: Reported on 12/13/2024)      metroNIDAZOLE (FLAGYL) 500 MG tablet  (Patient not taking: Reported on 11/11/2024)           Taking ASA due to AMA and nulliparity.     PERSONAL/SOCIAL HISTORY  Partner is involved,  Herber Toure     lives with their spouse.  Employment: Full time. Her job involves  .  History of anxiety or depression  None-     Additional items: Denies past or present domestic violence, sexual and psychological abuse.      PSYCHIATRIC: Denies history of anxiety/depression. Experiencing mood changes.   PHQ-9 score:         No data to display                   No data to display                  Past History:  Her past medical history No past medical history on file..   This is her first pregnancy  Since her last LMP she denies use of alcohol, tobacco and street drugs.  HISTORY:  Family History   Problem Relation Age of Onset    Diabetes Mother     Hypertension Mother     Hypertension Father     Diabetes Father     No Known Problems Maternal Grandmother     No Known Problems Maternal Grandfather     No Known Problems Paternal Grandmother     No Known Problems Paternal Grandfather     No Known Problems Brother      Social History     Socioeconomic History    Marital status:    Tobacco Use    Smoking status: Never     Passive exposure: Never    Smokeless tobacco: Never   Vaping Use    Vaping status: Never Used   Substance and Sexual Activity    Alcohol use: Not Currently    Drug use: Never    Sexual activity: Not Currently     Partners: Male     Birth control/protection: None     Current Outpatient Medications   Medication Sig Dispense Refill    aspirin 81 MG EC tablet Take 1 tablet (81 mg) by mouth daily. 100 tablet 4    Magnesium Oxide -Mg Supplement 500 MG  "CAPS Take 1 tablet by mouth every other day.      Prenatal Vit-Fe Fumarate-FA (PRENATAL PLUS) 27-1 MG TABS Take 1 tablet by mouth daily.      Vitamin D3 (VITAMIN D, CHOLECALCIFEROL,) 25 mcg (1000 units) tablet Take 1 tablet by mouth daily.      famotidine (PEPCID) 20 MG tablet Take 20 mg by mouth. (Patient not taking: Reported on 2024)      metroNIDAZOLE (FLAGYL) 500 MG tablet  (Patient not taking: Reported on 2024)       No current facility-administered medications for this visit.     Allergies   Allergen Reactions    Erythromycin Nausea and Vomiting, Hives, Other (See Comments) and Rash    Acetaminophen Palpitations, Other (See Comments) and Nausea     Paracetamol in Julia    Can tolerate 650 mg in the past       ============================================  MEDICAL HISTORY  No past medical history on file.  Past Surgical History:   Procedure Laterality Date    right knee tumor removed         OB History    Para Term  AB Living   1 0 0 0 0 0   SAB IAB Ectopic Multiple Live Births   0 0 0 0 0      # Outcome Date GA Lbr Uday/2nd Weight Sex Type Anes PTL Lv   1 Current               Obstetric Comments   *First Pregnancy               GYN History- Plan pap postpartum  Denies Abnormal Pap Smears                        Cervical procedures: None                        History of STI: None    I personally reviewed the past social/family/medical and surgical history on the date of service.       ROS: 10 point ROS neg other than the symptoms noted above in the HPI.    Objective  /77   Pulse 97   Ht 1.575 m (5' 2\")   Wt 49.9 kg (110 lb)   LMP 2024   BMI 20.12 kg/m     EXAM:  GENERAL:  Pleasant pregnant female, alert, cooperative  and well groomed.  SKIN:  Warm and dry, without lesions or rashes  HEAD: Symmetrical features.  MOUTH:  Buccal mucosa pink, moist without lesions.  Teeth in good repair.    LUNGS:  Clear to auscultation.  HEART:  RRR without murmur.  ABDOMEN: Soft without " masses , tenderness or organomegaly.  No CVA tenderness.  Uterus palpable at size equal to dates.  No scars noted.. Fetal heart tones present.  MUSCULOSKELETAL:  Full range of motion  EXTREMITIES:  No edema. No significant varicosities.   PELVIC EXAM:  Deferred   WET PREP:Not done    Assessment/Plan  34 year old , 25w0d weeks of pregnancy with BURAK of Mar 28, 2025 by LMP    Genetic Screening: Completed.     Patient Active Problem List    Diagnosis Date Noted    Maternal varicella, non-immune 2024     Priority: Medium    Encounter for supervision of primigravida of advanced maternal age in second trimester 2024     Priority: Medium     [facility] CNM or MD  Partner's name:   Employment:  []Dating- LMP  [x] 1st trimester screening: MFM referral placed for 1st trimester screening place  [x]Fetal anatomy US ordered  [x] recommended LD ASA after 12 wks for PRE-E risk  [x] No increased risk for GDM  [x]No need for utox in labor  []COVID vaccine completed  []Pap- plan postpartum    12-23wks________________________  [x]Rubella immune  []Hep B immune   []Varicella immune  [x]FLU shot 24    24-28wk_________________________  []EOB folder  []Labor plans:  []Prenatal Ed  []:  []Infant feeding plan  [] care provider choice  []PP Contraception plan: If tubal,consent date:  []TDAP   []Rhogam if needed, date:    29-35 wk________________________  []TOLAC consent done  [] Water birth interest  []GCT nml results  []RSV    36-37 wks______________________   [] GBS/CBC  []OTC PP meds sent  []PP recovery plans/support:  []Planning CS-ERAS pkt    38-42 wks______________________  []IOL reason/plans  []Postdates BPP        Family history of diabetes mellitus 2024     Priority: Medium     : hbg A1c 5.4  1 hour glucose today        Need for diphtheria-tetanus-pertussis (Tdap) vaccine 2024     Priority: Medium       Orders Placed This Encounter   Procedures    INFLUENZA VACCINE,  SPLIT VIRUS, TRIVALENT,PF (FLUZONE\FLUARIX)    Hepatitis B Surface Antibody    CBC with platelets    HGB Eval Reflex to ELP or RBC Solubility    Treponema Abs w Reflex to RPR and Titer    Varicella Zoster Virus Antibody IgG    Vitamin D Deficiency    Adult Type and Screen    OB/Maternity Back Brace for DME - ONLY FOR DME    Urine Culture    ABO/Rh type and screen       - Oriented to Practice, types of care, and how to reach a provider.  Pt prefers CNM team  - Educational handout on the prevention of infections diseases during pregnancy provided.  - Reviewed healthy diet and foods to avoid, exercise and activity during pregnancy; avoiding exposure to toxoplasmosis; and maintenance of a generally healthy lifestyle.   - Discussed the harms, benefits, side effects and alternative therapies for current prescribed and OTC medications. Patient was encouraged to start prenatal vitamins as tolerated.    - Reviewed use of triage nurse line and contacting the on-call provider after hours for an urgent need such as fever, vagina bleeding, bladder or vaginal infection, rupture of membranes,  or term labor.      - Reviewed best evidence for: weight gain for her weight and height for pregnancy:  Based on pre-pregnancy Body mass index is 20.12 kg/m . RECOMMENDED WEIGHT GAIN: 25-35 lbs.    - Pregnancy concerns to be addressed by provider at next OB visit include: Kick counts, EOB visit    - Prescription given for maternity belt. Can refer to PT if pain worsens.  -Is seeing therapy for mood changes, declines need for medication at this time.   -Prenatal classes information given.   - Continue aspirin.    -Flu vaccine administered.  - Labs ordered: 1 hour glucose test, cbc with plts, anti-trep, vitamin D, hepatitis B surface antibody, varicella, hemoglobin electrophoresis, urine culture.    - Plan EOB next visit.    - All pt's and partner's  questions discussed and answered.  Pt verbalized understanding of and agreement to plan  of care.     - Continue scheduled prenatal care in 4 weeks for EOB and prn if questions or concerns    I, Shannen ARANGO, am serving as a scribe; to document services personally performed by  Ruby Butler CNM based on data collection and the provider's statements to me.     Shannen ARANGO    The encounter was performed by me and scribed by the SNM. The scribed note accurately reflects my personal services and decisions made by me.     Ruby Butelr, ELIZABETH, VALERIO

## 2024-12-14 LAB — BACTERIA UR CULT: NORMAL

## 2024-12-15 LAB
HGB A1 MFR BLD: 96.6 %
HGB A2 MFR BLD: 2.8 %
HGB C MFR BLD: 0 %
HGB E MFR BLD: 0 %
HGB F MFR BLD: 0.6 %
HGB FRACT BLD ELPH-IMP: NORMAL
HGB OTHER MFR BLD: 0 %
HGB S BLD QL SOLY: NORMAL
HGB S MFR BLD: 0 %
PATH INTERP BLD-IMP: NORMAL

## 2024-12-16 PROBLEM — Z28.39 MATERNAL VARICELLA, NON-IMMUNE: Status: ACTIVE | Noted: 2024-12-16

## 2024-12-16 PROBLEM — O09.899 MATERNAL VARICELLA, NON-IMMUNE: Status: ACTIVE | Noted: 2024-12-16

## 2024-12-17 ENCOUNTER — MYC MEDICAL ADVICE (OUTPATIENT)
Dept: OBGYN | Facility: CLINIC | Age: 34
End: 2024-12-17
Payer: COMMERCIAL

## 2024-12-17 PROBLEM — Z23 NEED FOR DIPHTHERIA-TETANUS-PERTUSSIS (TDAP) VACCINE: Status: RESOLVED | Noted: 2024-09-03 | Resolved: 2024-12-17

## 2024-12-26 ENCOUNTER — MYC MEDICAL ADVICE (OUTPATIENT)
Dept: OBGYN | Facility: CLINIC | Age: 34
End: 2024-12-26
Payer: COMMERCIAL

## 2024-12-31 ENCOUNTER — MYC MEDICAL ADVICE (OUTPATIENT)
Dept: OBGYN | Facility: CLINIC | Age: 34
End: 2024-12-31
Payer: COMMERCIAL

## 2024-12-31 ENCOUNTER — NURSE TRIAGE (OUTPATIENT)
Dept: OBGYN | Facility: CLINIC | Age: 34
End: 2024-12-31
Payer: COMMERCIAL

## 2024-12-31 NOTE — TELEPHONE ENCOUNTER
"S-(situation): Spoke with Yuly about her current lower abdominal/pelvic discomfort.    B-(background):  at 27w4d GA.    A-(assessment): For the last couple of nights, Yuly has been experiencing an aching feeling in her lower abdomen/pelvic area - \"feels like a pulled muscle,\" 1-3/10 pain. Occasionally pain in lower back as well. Notices it especially when she is getting out of bed/up from a sitting position.   Resting/position changes typically help, but yesterday after she unloaded the , the pain was noticeable after resting (resolved when she fell asleep). Also noted some more Manuel-Levin contractions yesterday (about 30 minutes apart) - none today.  No change in fetal movement, LOF, vaginal bleeding, regular contractions. No urinary symptoms (besides increased urinary frequency). Pt endorses constipation yesterday, had a BM today.     R-(recommendations): Suspect round ligament pain due to pt's current symptoms. Went over interventions that could help. Scheduled for provider appointment this afternoon for pt comfort; if she is feeling better by then she will cancel. Went over red-flag symptoms/when to call us back.    "

## 2024-12-31 NOTE — TELEPHONE ENCOUNTER
[facility]    Phone Message    May a detailed message be left on voicemail: yes     Reason for Call: Symptoms or Concerns     If patient has red-flag symptoms, warm transfer to triage line    Current symptom or concern: Abdominal Discomfort/soreness    Symptoms have been present for:  1 day(s)    Has patient previously been seen for this? No    Action Taken: Other: OBGYN    Travel Screening: Not Applicable     Date of Service:

## 2025-01-09 ENCOUNTER — PRENATAL OFFICE VISIT (OUTPATIENT)
Dept: OBGYN | Facility: CLINIC | Age: 35
End: 2025-01-09
Attending: ADVANCED PRACTICE MIDWIFE
Payer: COMMERCIAL

## 2025-01-09 VITALS
HEART RATE: 91 BPM | SYSTOLIC BLOOD PRESSURE: 111 MMHG | DIASTOLIC BLOOD PRESSURE: 76 MMHG | BODY MASS INDEX: 21.09 KG/M2 | WEIGHT: 115.3 LBS

## 2025-01-09 DIAGNOSIS — O09.513 ENCOUNTER FOR SUPERVISION OF PRIMIGRAVIDA OF ADVANCED MATERNAL AGE IN THIRD TRIMESTER: Primary | ICD-10-CM

## 2025-01-09 DIAGNOSIS — Z23 NEED FOR TDAP VACCINATION: ICD-10-CM

## 2025-01-09 DIAGNOSIS — O26.10 LOW MATERNAL WEIGHT GAIN, UNSPECIFIED TRIMESTER: ICD-10-CM

## 2025-01-09 PROBLEM — O09.512 ENCOUNTER FOR SUPERVISION OF PRIMIGRAVIDA OF ADVANCED MATERNAL AGE IN SECOND TRIMESTER: Status: ACTIVE | Noted: 2024-12-13

## 2025-01-09 PROCEDURE — 99213 OFFICE O/P EST LOW 20 MIN: CPT | Performed by: ADVANCED PRACTICE MIDWIFE

## 2025-01-09 NOTE — PROGRESS NOTES
Subjective:      35 year old  at 28w6d presents for a routine prenatal appointment.     no vaginal bleeding or leakage of fluid.  Denies concerning contractions or cramping.   Reports regular fetal movement.       No HA, visual changes, RUQ or epigastric pain.    Patient concerns: Had a list of questions for today's visit. Reviewed warning signs, when to call for L&D, expectations for vaccinations, labor planning and birth options.   EOB folder given and discussed. Previous labs normal. Is working on birth plan and will bring to next visit with Ruby.     12 pound weight gain thus far. She is eating 3 meals per day and snacks. We reviewed ways to increase calories. Growth usn ordered to confirm fetal growth is appropriate given low maternal weight gain.     Objective:  Vitals:    25 0930   BP: 111/76   Pulse: 91   Weight: 52.3 kg (115 lb 4.8 oz)   , see ob flowsheet    Blood type: O POS   Assessment/Plan     Patient Active Problem List    Diagnosis Date Noted    Maternal varicella, non-immune 2024     Priority: Medium    Encounter for supervision of primigravida of advanced maternal age in second trimester 2024     Priority: Medium     [facility] CNM or MD  Partner's name:   Employment:  []Dating- LMP  [x] 1st trimester screening: MFM referral placed for 1st trimester screening place  [x]Fetal anatomy US ordered  [x] recommended LD ASA after 12 wks for PRE-E risk  [x] No increased risk for GDM  [x]No need for utox in labor  []COVID vaccine completed  []Pap- plan postpartum    12-23wks________________________  [x]Rubella immune  []Hep B immune   []Varicella immune  [x]FLU shot 24    24-28wk_________________________  [x]EOB folder  [x]Labor plans: Discussed epidural and other options  []Prenatal Ed  []:  [x]Infant feeding plan: breast  [] care provider choice: still deciding  []PP Contraception plan: If tubal,consent date:  [x]TDAP   []Rhogam if needed,  n/a    29-35 wk________________________  []TOLAC consent done  [] Water birth interest  [x]GCT nml results  []RSV    36-37 wks______________________   [] GBS/CBC  []OTC PP meds sent  []PP recovery plans/support:  []Planning CS-ERAS pkt    38-42 wks______________________  []IOL reason/plans  []Postdates BPP        Family history of diabetes mellitus 09/03/2024     Priority: Medium     12/13: hbg A1c 5.4  1 hour glucose today           Orders Placed This Encounter   Procedures    US OB Follow Up >14 Weeks    TDAP VACCINE (Adacel, Boostrix)  [[id number]]     Updated individualized prenatal care plan on the problem list for 3rd trimester    Return to clinic in 2 weeks and prn if questions or concerns.     Kendra Sosa CNM

## 2025-01-09 NOTE — PATIENT INSTRUCTIONS
Thank you for trusting us with your care!   Please be aware, if you are on Mychart, you may see your results prior to your providers review. If labs are abnormal, we will call or message you on [employer] with a follow up plan.    If you need to contact us for questions about:  Symptoms, Scheduling & Medical Questions; Non-urgent (2-3 day response) MyRoll message, Urgent (needing response today) [phone] (if after 3:30pm next day response)   Prescriptions: Please call your Pharmacy   Billing: Catherine [phone] or [facility]:[phone]

## 2025-01-14 ENCOUNTER — MYC MEDICAL ADVICE (OUTPATIENT)
Dept: OBGYN | Facility: CLINIC | Age: 35
End: 2025-01-14
Payer: COMMERCIAL

## 2025-01-19 ENCOUNTER — MYC MEDICAL ADVICE (OUTPATIENT)
Dept: OBGYN | Facility: CLINIC | Age: 35
End: 2025-01-19
Payer: COMMERCIAL

## 2025-01-21 DIAGNOSIS — O26.893 HEARTBURN DURING PREGNANCY IN THIRD TRIMESTER: ICD-10-CM

## 2025-01-21 DIAGNOSIS — R12 HEARTBURN DURING PREGNANCY IN THIRD TRIMESTER: ICD-10-CM

## 2025-01-21 DIAGNOSIS — O09.512 ENCOUNTER FOR SUPERVISION OF PRIMIGRAVIDA OF ADVANCED MATERNAL AGE IN SECOND TRIMESTER: Primary | ICD-10-CM

## 2025-01-21 RX ORDER — FAMOTIDINE 20 MG/1
20 TABLET, FILM COATED ORAL 2 TIMES DAILY PRN
Qty: 60 TABLET | Refills: 1 | Status: SHIPPED | OUTPATIENT
Start: 2025-01-21

## 2025-01-21 NOTE — TELEPHONE ENCOUNTER
Pt reports acidity and decreased appetite because of this. Used to take famotidine and requesting Rx. Routed to CNM team.

## 2025-01-21 NOTE — PATIENT INSTRUCTIONS
Weeks 26 to 30 of Your Pregnancy: Care Instructions  You're starting your last trimester. You'll probably feel your baby moving around more. Your back may ache as your body gets used to your baby's size and length. Take care of yourself, and pay attention to what your body needs.    Talk to your doctor about getting the Tdap shot. It will help protect your  against whooping cough (pertussis). Also ask your doctor about flu and COVID-19 shots if you haven't had them yet. If your blood type is Rh negative, you may be given a shot of Rh immune globulin (such as RhoGAM). It can help prevent problems for your baby.   You may have Buffalo-Levin contractions. They are single or several strong contractions without a pattern. These are practice contractions but not the start of labor.   Be kind to yourself.       Take breaks when you're tired.  Change positions often. Don't sit for too long or stand for too long.  At work, rest during breaks if you can. If you don't get breaks, talk to your doctor about writing a letter to your employer to request them.  Avoid fumes, chemicals, and tobacco smoke.  Be sexual if you want to.       You may be interested in sex, or you may not. Everyone is different.  Sex is okay unless your doctor tells you not to.  Your belly can make it hard to find good positions for sex. [place] and explore.  Watch for signs of  labor.        These signs include:  Menstrual-like cramps. Or you may have pain or pressure in your pelvis that happens in a pattern.  About 6 or more contractions in an hour (even after rest and a glass of water).  A low, dull backache that doesn't go away when you change positions.  An increase or change in vaginal discharge.  Light vaginal bleeding or spotting.  Your water breaking.  Know what to do if you think you are having contractions.       Drink 1 or 2 glasses of water.  Lie down on your left side for at least an hour.  While on your side, feel the top of  "your belly to see if it's tight.  Write down your contractions for an hour. Time how long it is from the start of one contraction to the start of the next.  Call your doctor if you have regular contractions.  Follow-up care is a key part of your treatment and safety. Be sure to make and go to all appointments, and call your doctor if you are having problems. It's also a good idea to know your test results and keep a list of the medicines you take.  Where can you learn more?  Go to https://[email or web address].net/patiented  Enter S999 in the search box to learn more about \"Weeks 26 to 30 of Your Pregnancy: Care Instructions.\"  Current as of: April 30, 2024  Content Version: 14.3    2024 [employer].   Care instructions adapted under license by your healthcare professional. If you have questions about a medical condition or this instruction, always ask your healthcare professional. [employer] disclaims any warranty or liability for your use of this information.    Weeks 30 to 32 of Your Pregnancy: Care Instructions  Your baby is growing more every day. Its eyes can open and close, and it may have hair on its head. Your baby may sleep 20 to 45 minutes at a time and is more active at certain times.    You should feel your baby move several times every day. Your baby now turns less and kicks more.   This is a good time to tour your hospital or birthing center. You may also want to find childcare if needed.         To ease heartburn   Avoid foods that make your symptoms worse, such as chocolate, spicy foods, and caffeine.  Avoid bending over or lying down after meals.  Do not eat for 2 hours before bedtime.  Take antacids like Tums, but don't take ones that have sodium bicarbonate, magnesium trisilicate, or aspirin.        To care for large, swollen veins (varicose veins)   Try to avoid standing for long periods of time.  Sit with your feet propped up.  Wear support hose.  Get some exercise every day, " "like walking or swimming.  Counting your baby's kicks  Your doctor may ask you to count your baby's movements, such as kicks, flutters, or rolls.    Find a quiet place, and get comfortable. Write down your start time. Count your baby's movements (except hiccups). When your baby has moved 10 times, you can stop counting. Write down how many minutes it took.   If an hour goes by and you don't feel 10 movements, have something to eat or drink. Count for another hour. If you don't feel at least 10 movements in the 2-hour period, call your doctor.   Follow-up care is a key part of your treatment and safety. Be sure to make and go to all appointments, and call your doctor if you are having problems. It's also a good idea to know your test results and keep a list of the medicines you take.  Where can you learn more?  Go to https://www.[employer]/patiented  Enter X471 in the search box to learn more about \"Weeks 30 to 32 of Your Pregnancy: Care Instructions.\"  Current as of: April 30, 2024  Content Version: 14.3    2024 [employer].   Care instructions adapted under license by your healthcare professional. If you have questions about a medical condition or this instruction, always ask your healthcare professional. [employer] disclaims any warranty or liability for your use of this information.    Learning About Birth Control After Childbirth  Birth control is any method used to prevent pregnancy. If you have vaginal sex without birth control, you could get pregnant--even if you haven't started having periods again. You're less likely to get pregnant while breastfeeding, but it's still possible. Finding birth control that works for you can help avoid an unplanned pregnancy.  There are many kinds of birth control. Each has pros and cons. Find what works for you. Talk to your doctor if you've just given birth or are breastfeeding.    Long-acting reversible contraception (LARC). These are placed " "inside your body by a doctor. They can prevent pregnancy for years.  Examples include:  An implant (hormonal).  Copper intrauterine device (IUD).  Hormonal IUDs.    Short-acting hormonal methods. These release hormones. Examples include:  Combination birth control pills (\"the pill\").  Skin patches.  A vaginal ring.  A shot.  Mini-pills. Choose progestin-only options soon after giving birth.    Barrier methods. Use these every time you have vaginal sex.  Examples include:  External (male) condoms.  Internal (female) condoms.  Diaphragms.  Cervical caps.  Sponges.    Spermicides. These kill sperm or stop sperm from moving. They can be gels, creams, foams, films, or tablets. Use them before vaginal sex.  Examples include:  Nonoxynol-9.  pH regulator gel.    Permanent birth control (sterilization). This can be an option if you're sure that you don't want to get pregnant later.  Examples include:  Vasectomy.  Having tubes tied (tubal ligation).    Emergency contraception. This is a backup method. Use it if you didn't use birth control or your birth control method failed.  Examples include:  Copper and hormonal IUDs.  Emergency contraceptive pills.    Fertility awareness. You'll learn when you are most likely to become pregnant (are fertile). You can avoid vaginal sex at that time.  It's also called:  Natural family planning.  The rhythm method.    Breastfeeding. This is most effective when all of these are true:  Your baby is younger than 6 months old.  You're breastfeeding and not bottle-feeding at all.  You aren't having periods.  Follow-up care is a key part of your treatment and safety. Be sure to make and go to all appointments, and call your doctor if you are having problems. It's also a good idea to know your test results and keep a list of the medicines you take.  Where can you learn more?  Go to https://www.healthwise.net/patiented  Enter X408 in the search box to learn more about \"Learning About Birth Control " "After Childbirth.\"  Current as of: April 30, 2024  Content Version: 14.3    2024 [employer].   Care instructions adapted under license by your healthcare professional. If you have questions about a medical condition or this instruction, always ask your healthcare professional. [employer] disclaims any warranty or liability for your use of this information.    "

## 2025-01-22 ENCOUNTER — PRENATAL OFFICE VISIT (OUTPATIENT)
Dept: OBGYN | Facility: CLINIC | Age: 35
End: 2025-01-22
Attending: ADVANCED PRACTICE MIDWIFE
Payer: COMMERCIAL

## 2025-01-22 VITALS
HEART RATE: 108 BPM | SYSTOLIC BLOOD PRESSURE: 112 MMHG | DIASTOLIC BLOOD PRESSURE: 76 MMHG | BODY MASS INDEX: 20.94 KG/M2 | WEIGHT: 114.5 LBS

## 2025-01-22 DIAGNOSIS — O26.10 LOW MATERNAL WEIGHT GAIN, UNSPECIFIED TRIMESTER: ICD-10-CM

## 2025-01-22 DIAGNOSIS — O09.512 ENCOUNTER FOR SUPERVISION OF PRIMIGRAVIDA OF ADVANCED MATERNAL AGE IN SECOND TRIMESTER: Primary | ICD-10-CM

## 2025-01-22 PROCEDURE — 99213 OFFICE O/P EST LOW 20 MIN: CPT | Performed by: ADVANCED PRACTICE MIDWIFE

## 2025-01-22 ASSESSMENT — PAIN SCALES - GENERAL: PAINLEVEL_OUTOF10: NO PAIN (0)

## 2025-01-22 NOTE — PROGRESS NOTES
"Subjective:      35 year old  at 30w5d presents for a routine prenatal appointment.      Denies cramping/contractions, vaginal bleeding, discharge or leakage of fluid. Reports +fetal movement.  No HA, vision changes, ruq/epigastric pain.     Patient concerns: Feeling well overall. Present with partner. Experiencing heartburn. Picked up pepcid and has been taking 1x/day. Will increase to 2x/day. Has noticed some \"hot flashes\" when sitting in car or for longer period of time. No SOB or LOC.        Has been thinking about labor/birth plans. Desires epidural. Plans to breastfeed. Wants to note that she even outside of pregnancy, she has limited bending mobility in her right knee. May affect positions she can be in.     11lb weight gain since first visit. Wondering about baby size and fetal position.     Objective:  Vitals:    25 1627   BP: 112/76   Pulse: (!) 108   Weight: 51.9 kg (114 lb 8 oz)     See ob flowsheet    Assessment/Plan     Patient Active Problem List    Diagnosis Date Noted    Low maternal weight gain, unspecified trimester- grwoth US /2025     Priority: Medium     11lb at 30+5, FH WNL    Growth us scheduled for       Maternal varicella, non-immune 2024     Priority: Medium    Encounter for supervision of primigravida of advanced maternal age in second trimester 2024     Priority: Medium     [facility] CNM or MD  Partner's name:   Employment:  []Dating- LMP  [x] 1st trimester screening: MFM referral placed for 1st trimester screening place  [x]Fetal anatomy US ordered  [x] recommended LD ASA after 12 wks for PRE-E risk  [x] No increased risk for GDM  [x]No need for utox in labor  []COVID vaccine completed  []Pap- plan postpartum    12-23wks________________________  [x]Rubella immune  [x]Hep B immune   []Varicella  NON immune  [x]FLU shot 24    24-28wk_________________________  [x]EOB folder  [x]Labor plans: epidural  []Prenatal Ed  []:  [x]Infant " feeding plan: breast  [][place] care provider choice: still deciding  []PP Contraception plan: If tubal,consent date:  [x]TDAP   []Rhogam if needed, n/a    29-35 wk________________________  []TOLAC consent done NA  [] Water birth interest no  [x]GCT nml results  []RSV    36-37 wks______________________   [] GBS/CBC  []OTC PP meds sent  []PP recovery plans/support:  []Planning CS-ERAS pkt    38-42 wks______________________  []IOL reason/plans  []Postdates BPP        Family history of diabetes mellitus 2024     Priority: Medium     : hbg A1c 5.4  1 hour glucose today         Updated individualized prenatal care plan on the problem list for 3rd trimester    - If pepcid is not improving symptoms, may switch to OTC prilosec or prevacid.  - Reviewed labs. Varicella nonimmune. Recommend vaccination postpartum.  - Growth ultrasound scheduled for 25.    Return to clinic in 2 weeks for US and RAH  and prn if questions or concerns.     ELIZABETH Tadeo, CARRILLOM

## 2025-01-22 NOTE — NURSING NOTE
30.5 weeks ob visit  feeling epigastric pressure  thinking it is heart burn taking pepcid once a day  will switch to twice a day.     Shortness of breath   worse with laying down.    This started after week 30.

## 2025-01-27 PROBLEM — O26.10 LOW MATERNAL WEIGHT GAIN, UNSPECIFIED TRIMESTER: Status: ACTIVE | Noted: 2025-01-27

## 2025-02-04 ENCOUNTER — PRENATAL OFFICE VISIT (OUTPATIENT)
Dept: OBGYN | Facility: CLINIC | Age: 35
End: 2025-02-04
Attending: ADVANCED PRACTICE MIDWIFE
Payer: COMMERCIAL

## 2025-02-04 ENCOUNTER — ANCILLARY PROCEDURE (OUTPATIENT)
Dept: ULTRASOUND IMAGING | Facility: CLINIC | Age: 35
End: 2025-02-04
Attending: ADVANCED PRACTICE MIDWIFE
Payer: COMMERCIAL

## 2025-02-04 VITALS
DIASTOLIC BLOOD PRESSURE: 81 MMHG | SYSTOLIC BLOOD PRESSURE: 116 MMHG | WEIGHT: 119.4 LBS | HEART RATE: 99 BPM | BODY MASS INDEX: 21.84 KG/M2

## 2025-02-04 DIAGNOSIS — O26.10 LOW MATERNAL WEIGHT GAIN, UNSPECIFIED TRIMESTER: ICD-10-CM

## 2025-02-04 DIAGNOSIS — O09.512 ENCOUNTER FOR SUPERVISION OF PRIMIGRAVIDA OF ADVANCED MATERNAL AGE IN SECOND TRIMESTER: Primary | ICD-10-CM

## 2025-02-04 DIAGNOSIS — Z3A.32 32 WEEKS GESTATION OF PREGNANCY: ICD-10-CM

## 2025-02-04 PROCEDURE — 76816 OB US FOLLOW-UP PER FETUS: CPT

## 2025-02-04 PROCEDURE — 76816 OB US FOLLOW-UP PER FETUS: CPT | Mod: 26 | Performed by: OBSTETRICS & GYNECOLOGY

## 2025-02-04 PROCEDURE — 99213 OFFICE O/P EST LOW 20 MIN: CPT | Performed by: MIDWIFE

## 2025-02-04 NOTE — PATIENT INSTRUCTIONS
Thank you for trusting us with your care!   Please be aware, if you are on Mychart, you may see your results prior to your providers review. If labs are abnormal, we will call or message you on Mychart with a follow up plan.    If you need to contact us for questions about:  Symptoms, Scheduling & Medical Questions; Non-urgent (2-3 day response) Mychart message, Urgent (needing response today) [phone] (if after 3:30pm next day response)   Prescriptions: Please call your Pharmacy   Billing: [facility] [phone] or [facility]:[phone]    Weeks 32 to 34 of Your Pregnancy: Care Instructions    Decide whether you want to bank or donate your baby's umbilical cord blood. If you want to save this blood, you have to arrange for it ahead of time.   Decide about circumcision. Personal, Hoahaoism, or cultural beliefs may play a role in your decision. You get to decide what you want for your baby.         Learn how to ease hemorrhoids.   Get more liquids, fruits, vegetables, and fiber in your diet.  Avoid sitting for too long.  Clean yourself with moist toilet paper. Or try witch hazel pads.  Try ice packs or warm sitz baths for discomfort.  Use hydrocortisone cream for pain or itching.  Ask your doctor about stool softeners.        Consider the benefits of breastfeeding.   It reduces your baby's risk of sudden infant death syndrome (SIDS).   babies are less likely to get certain infections. And they're less likely to be obese or get diabetes later in life.  It can lower your risk of breast and ovarian cancers and osteoporosis.  It saves you money.  Follow-up care is a key part of your treatment and safety. Be sure to make and go to all appointments, and call your doctor if you are having problems. It's also a good idea to know your test results and keep a list of the medicines you take.  Where can you learn more?  Go to https://www.healthwise.net/patiented  Enter X711 in the search box to learn more about  "\"Weeks 32 to 34 of Your Pregnancy: Care Instructions.\"  Current as of: April 30, 2024  Content Version: 14.3    2024 [employer].   Care instructions adapted under license by your healthcare professional. If you have questions about a medical condition or this instruction, always ask your healthcare professional. [employer] disclaims any warranty or liability for your use of this information.    You have been provided the Any Day Now: Early Labor at Home document.    Additional copies can be found here:  [email or web address]  You have been provided the What I'd Wish I'd Known About Giving Birth document.    Additional copies can be found here:  [email or web address]  "

## 2025-02-04 NOTE — PROGRESS NOTES
.Subjective:      35 year old  at 32w4d presents for a routine prenatal appointment.     no vaginal bleeding or leakage of fluid.  Denies concerning contractions or cramping.   Reports regular fetal movement.       No HA, visual changes, RUQ or epigastric pain.    Patient concerns:  here with spouse had growth US today 19%  pt gained 4 # from last visit S=D reassurred  plan to repeat growth in 4 wks    Reviewed f/u questions    wondering about iron in foods  does eat meats for protein dairy leafy green vegetables last hgb  12.2  Does not plan   spouse will be support   Plans [facility], has 12 wk leave spouse 16 wk  her parents will spend [place] in [place] between [place] and her brother In [place] who is also having their first child a month after [place]     Objective:  Vitals:    25 1554   BP: 116/81   Pulse: 99   Weight: 54.2 kg (119 lb 6.4 oz)   , see ob flowsheet    Blood type: O POS   Assessment/Plan     Patient Active Problem List    Diagnosis Date Noted    Low maternal weight gain, unspecified trimester- grwoth US 2025     Priority: Medium     11lb at 30+5, FH WNL    Growth us scheduled for   US 19%  pt gained  4 # TWG       Maternal varicella, non-immune 2024     Priority: Medium    Encounter for supervision of primigravida of advanced maternal age in second trimester 2024     Priority: Medium     [facility] CNM or MD  Partner's name:   Employment:  []Dating- LMP  [x] 1st trimester screening: MFM referral placed for 1st trimester screening place  [x]Fetal anatomy US ordered  [x] recommended LD ASA after 12 wks for PRE-E risk  [x] No increased risk for GDM  [x]No need for utox in labor  []COVID vaccine completed  []Pap- plan postpartum    12-23wks________________________  [x]Rubella immune  [x]Hep B immune   []Varicella  NON immune  [x]FLU shot 24    24-28wk_________________________  [x]EOB folder  [x]Labor plans: epidural  [x]Prenatal  Ed  []:NO  [x]Infant feeding plan: breast  [x][place] care provider choice:[place]   []PP Contraception plan: reviewed options   [x]TDAP   []Rhogam if needed, n/a    29-35 wk________________________  []TOLAC consent done NA  [] Water birth interest no  [x]GCT nml results  []RSV    36-37 wks______________________   [] GBS/CBC  []OTC PP meds sent  [x]PP recovery plans/support: plans 12 wks off  spouse 16 wks may split   Her parents will travel from [place]  maybe 3 months  splitting between her brother who is also having a baby 1 m later   []Planning CS-ERAS pkt    38-42 wks______________________  []IOL reason/plans  []Postdates BPP        Family history of diabetes mellitus 2024     Priority: Medium     : hbg A1c 5.4  1 hour glucose today         (O09.512) Encounter for supervision of primigravida of advanced maternal age in second trimester  (primary encounter diagnosis)    Plan: US OB Follow Up >14 Weeks repeat growth in 4 wks              (O26.10) Low maternal weight gain, unspecified trimester- gained 4 # TWG 16     Orders Placed This Encounter   Procedures    US OB Follow Up >14 Weeks       Updated individualized prenatal care plan on the problem list for 3rd trimester    Return to clinic in 2 weeks and prn if questions or concerns.     Karen Milligan, ELIZABETH LUO

## 2025-02-19 NOTE — PATIENT INSTRUCTIONS
Weeks 34 to 36 of Your Pregnancy: Care Instructions  Your belly has grown quite large. It's almost time to give birth! Your baby's lungs are almost ready to breathe air. The skull bones are firm enough to protect your baby's head. But they're soft enough to move down through the birth canal.    You might be wondering what to expect during labor. Because each birth is different, there's no way to know exactly what childbirth will be like for you. Talk to your doctor or midwife about any concerns you have.   You'll probably have a test for group B streptococcus (GBS). GBS is bacteria that can live in the vagina and rectum. GBS can make your baby sick after birth. If you test positive, you'll get antibiotics during labor.     Choose what type of pain relief you would like during labor.  You can choose from a few types, including medicine and non-medicine options. You may want to use several types of pain relief.     Know how medicines can help with pain during labor.  Some medicines lower anxiety and help with some of the pain. Others make your lower body numb so that you will feel less pain.     Tell your doctor about your pain medicine choice.  Do this before you start labor or very early in your labor. You may be able to change your mind during labor.     Learn about the stages of labor.    The first stage includes the early (latent) and active phases of labor. Contractions start in early labor. During active labor, contractions get stronger, last longer, and happen more often. And the cervix opens more rapidly.  The second stage starts when you're ready to push. During this stage, your baby is born.  During the third stage, you'll usually have a few more contractions to push out the placenta.   Follow-up care is a key part of your treatment and safety. Be sure to make and go to all appointments, and call your doctor if you are having problems. It's also a good idea to know your test results and keep a list of the  "medicines you take.  Where can you learn more?  Go to https://www.[employer].net/patiented  Enter B912 in the search box to learn more about \"Weeks 34 to 36 of Your Pregnancy: Care Instructions.\"  Current as of: 2024  Content Version: 14.3    2024 [employer].   Care instructions adapted under license by your healthcare professional. If you have questions about a medical condition or this instruction, always ask your healthcare professional. [employer] disclaims any warranty or liability for your use of this information.    Group B Strep During Pregnancy: Care Instructions  Overview     Group B strep infection is caused by a type of bacteria. It's a different kind of bacteria than the kind that causes strep throat.  You may have this kind of bacteria in your body. Sometimes it may cause an infection, but most of the time it doesn't make you sick or cause symptoms. But if you pass the bacteria to your baby during the birth, it can cause serious health problems for your baby.  If you have this bacteria in your body, you will get antibiotics when you are in labor. Antibiotics help prevent problems for a  baby.  After birth, doctors will watch and may test your baby. If your baby tests positive for Group B strep, your baby will get antibiotics.  If you plan to breastfeed your baby, don't worry. It will be safe to breastfeed.  Follow-up care is a key part of your treatment and safety. Be sure to make and go to all appointments, and call your doctor if you are having problems. It's also a good idea to know your test results and keep a list of the medicines you take.  How can you care for yourself at home?  If your doctor has prescribed antibiotics, take them as directed. Do not stop taking them just because you feel better. You need to take the full course of antibiotics.  Tell your doctor if you are allergic to any antibiotic.  If you go into labor, or your water breaks, go to the " "hospital. Your doctor will give you antibiotics to help protect your baby from infection.  Tell the doctors and nurses if you have an allergy to penicillin.  Tell the doctors and nurses at the hospital that you tested positive for group B strep.  When should you call for help?   Call your doctor now or seek immediate medical care if:    You have symptoms of a urinary tract infection. These may include:  Pain or burning when you urinate.  A frequent need to urinate without being able to pass much urine.  Pain in the flank, which is just below the rib cage and above the waist on either side of the back.  Blood in your urine.  A fever.     You think you are in labor or your water has broken.     You have pain in your belly or pelvis.   Watch closely for changes in your health, and be sure to contact your doctor if you have any problems.  Where can you learn more?  Go to https://www.[employer]/patiented  Enter M001 in the search box to learn more about \"Group B Strep During Pregnancy: Care Instructions.\"  Current as of: April 30, 2024  Content Version: 14.3    2024 [employer].   Care instructions adapted under license by your healthcare professional. If you have questions about a medical condition or this instruction, always ask your healthcare professional. [employer] disclaims any warranty or liability for your use of this information.    Circumcision in Infants: What to Expect at Home  Your Child's Recovery  After circumcision, your baby's penis may look red and swollen. It may have petroleum jelly and gauze on it. The gauze will likely come off when your baby urinates. Follow your doctor's directions about whether to put clean gauze back on your baby's penis or to leave the gauze off. If you need to remove gauze from the penis, use warm water to soak the gauze and gently loosen it.  The doctor may have used a Plastibell device to do the circumcision. If so, your baby will have a plastic " ring around the head of the penis. The ring should fall off by itself in 10 to 12 days.  A thin, yellow film may form over the area the day after the procedure. This is part of the normal healing process. It should go away in a few days.  Your baby may seem fussy while the area heals. It may hurt for your baby to urinate. This pain often gets better in 3 or 4 days. But it may last for up to 2 weeks.  Even though your baby's penis will likely start to feel better after 3 or 4 days, it may look worse. The penis often starts to look like it's getting better after about 7 to 10 days.  This care sheet gives you a general idea about how long it will take for your child to recover. But each child recovers at a different pace. Follow the steps below to help your child get better as quickly as possible.  How can you care for your child at home?  Activity    Let your baby rest as much as possible. Sleeping will help with recovery.     You can give your baby a sponge bath the day after surgery. Ask your doctor when it is okay to give your baby a bath.   Medicines    Your doctor will tell you if and when your child can restart any medicines. The doctor will also give you instructions about your child taking any new medicines.     Your doctor may recommend giving your baby acetaminophen (Tylenol) to help with pain after the procedure. Be safe with medicines. Give your child medicines exactly as prescribed. Call your doctor if you think your child is having a problem with a medicine.     Do not give your child two or more pain medicines at the same time unless the doctor told you to. Many pain medicines have acetaminophen, which is Tylenol. Too much acetaminophen (Tylenol) can be harmful.   Circumcision care    Always wash your hands before and after touching the circumcision area.     Gently wash your baby's penis with plain, warm water after each diaper change, and pat it dry. Do not use soap. Don't use hydrogen peroxide or  "alcohol. They can slow healing.     Do not try to remove the film that forms on the penis. The film will go away on its own.     Put plenty of petroleum jelly (such as Vaseline) on the circumcision area during each diaper change. This will prevent your baby's penis from sticking to the diaper while it heals.     Fasten your baby's diapers loosely so that there is less pressure on the penis while it heals.   Follow-up care is a key part of your child's treatment and safety. Be sure to make and go to all appointments, and call your doctor if your child is having problems. It's also a good idea to know your child's test results and keep a list of the medicines your child takes.  When should you call for help?   Call your doctor now or seek immediate medical care if:    Your baby has a fever over 100.4 F.     Your baby is extremely fussy or irritable, has a high-pitched cry, or refuses to eat.     Your baby does not have a wet diaper within 12 hours after the circumcision.     You find a spot of bleeding larger than a 2-inch Elim IRA from the incision.     Your baby has signs of infection. Signs may include severe swelling; redness; a red streak on the shaft of the penis; or a thick, yellow discharge.   Watch closely for changes in your child's health, and be sure to contact your doctor if:    A Plastibell device was used for the circumcision and the ring has not fallen off after 10 to 12 days.   Where can you learn more?  Go to https://www.[employer].net/patiented  Enter S255 in the search box to learn more about \"Circumcision in Infants: What to Expect at Home.\"  Current as of: October 24, 2023  Content Version: 14.3    2024 [employer]   Care instructions adapted under license by your healthcare professional. If you have questions about a medical condition or this instruction, always ask your healthcare professional. "[employer]" disclaims any warranty or liability for your use of this " information.

## 2025-02-19 NOTE — PROGRESS NOTES
"Subjective:      35 year old  at 34w6d presents for a routine prenatal appointment.      Denies cramping/contractions, vaginal bleeding, discharge or leakage of fluid. Reports +fetal movement.  No HA, vision changes, ruq/epigastric pain.     Patient concerns: Feeling well overall.  Wondering about last visit's growth ultrasound result. Done 25: EFW 19%, AC 29.5%.  Had more weight gain since last visit. Feels that appetite has improved. Will repeat US next vist  Notes more pelvic pressure, random abdominal tightening.     Questions re: raspberry leaf tea, dates, strategies to decrease risk of perineal tears.     Objective:  Vitals:    25 1421   BP: 120/84   Pulse: 114   Weight: 57 kg (125 lb 9.6 oz)   Height: 1.575 m (5' 2\")     See ob flowsheet    Assessment/Plan     Patient Active Problem List    Diagnosis Date Noted    Low maternal weight gain, unspecified trimester- growth US 2025     Priority: Medium     11lb at 30+5, FH WNL    Growth us scheduled for   US 19%  pt gained  4 # TWG       Maternal varicella, non-immune 2024     Priority: Medium    Encounter for supervision of primigravida of advanced maternal age in second trimester 2024     Priority: Medium     [facility] CNM or MD  Partner's name:   Employment:  []Dating- LMP  [x] 1st trimester screening: MFM referral placed for 1st trimester screening place  [x]Fetal anatomy US ordered  [x] recommended LD ASA after 12 wks for PRE-E risk  [x] No increased risk for GDM  [x]No need for utox in labor  []COVID vaccine completed  []Pap- plan postpartum    12-23wks________________________  [x]Rubella immune  [x]Hep B immune   []Varicella  NON immune  [x]FLU shot 24    24-28wk_________________________  [x]EOB folder  [x]Labor plans: epidural  [x]Prenatal Ed  []:NO  [x]Infant feeding plan: breast  [x] care provider choice:[facility]   []PP Contraception plan: reviewed options   [x]TDAP   []Rhogam if " needed, n/a    29-35 wk________________________  []TOLAC consent done NA  [] Water birth interest no  [x]GCT nml results  []RSV    36-37 wks______________________   [] GBS/CBC  []OTC PP meds sent  [x]PP recovery plans/support: plans 12 wks off  spouse 16 wks may split   Her parents will travel from [place]  maybe 3 months  splitting between her brother who is also having a baby 1 m later   []Planning CS-ERAS pkt    38-42 wks______________________  []IOL reason/plans  []Postdates BPP        Family history of diabetes mellitus 09/03/2024     Priority: Medium     12/13: hbg A1c 5.4  1 hour glucose today           Updated individualized prenatal care plan on the problem list for 3rd trimester    - Reviewed pt questions. Recommended [email or web address].   - Plan GBS and CBC with plts 36-37 weeks.   - FH < dates. Repeat growth ultrasound 3/4/25.    Return to clinic in 2 weeks and prn if questions or concerns.     Ruby Butler, ELIZABETH, CARRILLOM

## 2025-02-20 ENCOUNTER — PRENATAL OFFICE VISIT (OUTPATIENT)
Dept: OBGYN | Facility: CLINIC | Age: 35
End: 2025-02-20
Attending: ADVANCED PRACTICE MIDWIFE
Payer: COMMERCIAL

## 2025-02-20 VITALS
SYSTOLIC BLOOD PRESSURE: 120 MMHG | BODY MASS INDEX: 23.11 KG/M2 | DIASTOLIC BLOOD PRESSURE: 84 MMHG | HEIGHT: 62 IN | HEART RATE: 114 BPM | WEIGHT: 125.6 LBS

## 2025-02-20 DIAGNOSIS — O26.10 LOW MATERNAL WEIGHT GAIN, UNSPECIFIED TRIMESTER: ICD-10-CM

## 2025-02-20 DIAGNOSIS — O09.513 ENCOUNTER FOR SUPERVISION OF PRIMIGRAVIDA OF ADVANCED MATERNAL AGE IN THIRD TRIMESTER: Primary | ICD-10-CM

## 2025-02-20 PROCEDURE — 99213 OFFICE O/P EST LOW 20 MIN: CPT | Performed by: ADVANCED PRACTICE MIDWIFE

## 2025-02-27 ENCOUNTER — TELEPHONE (OUTPATIENT)
Dept: OBGYN | Facility: CLINIC | Age: 35
End: 2025-02-27
Payer: COMMERCIAL

## 2025-02-27 NOTE — PATIENT INSTRUCTIONS
Weeks 34 to 36 of Your Pregnancy: Care Instructions  Your belly has grown quite large. It's almost time to give birth! Your baby's lungs are almost ready to breathe air. The skull bones are firm enough to protect your baby's head. But they're soft enough to move down through the birth canal.    You might be wondering what to expect during labor. Because each birth is different, there's no way to know exactly what childbirth will be like for you. Talk to your doctor or midwife about any concerns you have.   You'll probably have a test for group B streptococcus (GBS). GBS is bacteria that can live in the vagina and rectum. GBS can make your baby sick after birth. If you test positive, you'll get antibiotics during labor.     Choose what type of pain relief you would like during labor.  You can choose from a few types, including medicine and non-medicine options. You may want to use several types of pain relief.     Know how medicines can help with pain during labor.  Some medicines lower anxiety and help with some of the pain. Others make your lower body numb so that you will feel less pain.     Tell your doctor about your pain medicine choice.  Do this before you start labor or very early in your labor. You may be able to change your mind during labor.     Learn about the stages of labor.    The first stage includes the early (latent) and active phases of labor. Contractions start in early labor. During active labor, contractions get stronger, last longer, and happen more often. And the cervix opens more rapidly.  The second stage starts when you're ready to push. During this stage, your baby is born.  During the third stage, you'll usually have a few more contractions to push out the placenta.   Follow-up care is a key part of your treatment and safety. Be sure to make and go to all appointments, and call your doctor if you are having problems. It's also a good idea to know your test results and keep a list of the  "medicines you take.  Where can you learn more?  Go to https://www.jellyfish.net/patiented  Enter B912 in the search box to learn more about \"Weeks 34 to 36 of Your Pregnancy: Care Instructions.\"  Current as of: 2024  Content Version: 14.3    2024 [facility].   Care instructions adapted under license by your healthcare professional. If you have questions about a medical condition or this instruction, always ask your healthcare professional. [facility] disclaims any warranty or liability for your use of this information.    Group B Strep During Pregnancy: Care Instructions  Overview     Group B strep infection is caused by a type of bacteria. It's a different kind of bacteria than the kind that causes strep throat.  You may have this kind of bacteria in your body. Sometimes it may cause an infection, but most of the time it doesn't make you sick or cause symptoms. But if you pass the bacteria to your baby during the birth, it can cause serious health problems for your baby.  If you have this bacteria in your body, you will get antibiotics when you are in labor. Antibiotics help prevent problems for a  baby.  After birth, doctors will watch and may test your baby. If your baby tests positive for Group B strep, your baby will get antibiotics.  If you plan to breastfeed your baby, don't worry. It will be safe to breastfeed.  Follow-up care is a key part of your treatment and safety. Be sure to make and go to all appointments, and call your doctor if you are having problems. It's also a good idea to know your test results and keep a list of the medicines you take.  How can you care for yourself at home?  If your doctor has prescribed antibiotics, take them as directed. Do not stop taking them just because you feel better. You need to take the full course of antibiotics.  Tell your doctor if you are allergic to any antibiotic.  If you go into labor, or your water breaks, go to the " "hospital. Your doctor will give you antibiotics to help protect your baby from infection.  Tell the doctors and nurses if you have an allergy to penicillin.  Tell the doctors and nurses at the hospital that you tested positive for group B strep.  When should you call for help?   Call your doctor now or seek immediate medical care if:    You have symptoms of a urinary tract infection. These may include:  Pain or burning when you urinate.  A frequent need to urinate without being able to pass much urine.  Pain in the flank, which is just below the rib cage and above the waist on either side of the back.  Blood in your urine.  A fever.     You think you are in labor or your water has broken.     You have pain in your belly or pelvis.   Watch closely for changes in your health, and be sure to contact your doctor if you have any problems.  Where can you learn more?  Go to https://[email or web address].Baike.com/patiented  Enter M001 in the search box to learn more about \"Group B Strep During Pregnancy: Care Instructions.\"  Current as of: April 30, 2024  Content Version: 14.3    2024 [employer].   Care instructions adapted under license by your healthcare professional. If you have questions about a medical condition or this instruction, always ask your healthcare professional. [employer] disclaims any warranty or liability for your use of this information.    Circumcision in Infants: What to Expect at Home  Your Child's Recovery  After circumcision, your baby's penis may look red and swollen. It may have petroleum jelly and gauze on it. The gauze will likely come off when your baby urinates. Follow your doctor's directions about whether to put clean gauze back on your baby's penis or to leave the gauze off. If you need to remove gauze from the penis, use warm water to soak the gauze and gently loosen it.  The doctor may have used a Plastibell device to do the circumcision. If so, your baby will have a plastic " ring around the head of the penis. The ring should fall off by itself in 10 to 12 days.  A thin, yellow film may form over the area the day after the procedure. This is part of the normal healing process. It should go away in a few days.  Your baby may seem fussy while the area heals. It may hurt for your baby to urinate. This pain often gets better in 3 or 4 days. But it may last for up to 2 weeks.  Even though your baby's penis will likely start to feel better after 3 or 4 days, it may look worse. The penis often starts to look like it's getting better after about 7 to 10 days.  This care sheet gives you a general idea about how long it will take for your child to recover. But each child recovers at a different pace. Follow the steps below to help your child get better as quickly as possible.  How can you care for your child at home?  Activity    Let your baby rest as much as possible. Sleeping will help with recovery.     You can give your baby a sponge bath the day after surgery. Ask your doctor when it is okay to give your baby a bath.   Medicines    Your doctor will tell you if and when your child can restart any medicines. The doctor will also give you instructions about your child taking any new medicines.     Your doctor may recommend giving your baby acetaminophen (Tylenol) to help with pain after the procedure. Be safe with medicines. Give your child medicines exactly as prescribed. Call your doctor if you think your child is having a problem with a medicine.     Do not give your child two or more pain medicines at the same time unless the doctor told you to. Many pain medicines have acetaminophen, which is Tylenol. Too much acetaminophen (Tylenol) can be harmful.   Circumcision care    Always wash your hands before and after touching the circumcision area.     Gently wash your baby's penis with plain, warm water after each diaper change, and pat it dry. Do not use soap. Don't use hydrogen peroxide or  "alcohol. They can slow healing.     Do not try to remove the film that forms on the penis. The film will go away on its own.     Put plenty of petroleum jelly (such as Vaseline) on the circumcision area during each diaper change. This will prevent your baby's penis from sticking to the diaper while it heals.     Fasten your baby's diapers loosely so that there is less pressure on the penis while it heals.   Follow-up care is a key part of your child's treatment and safety. Be sure to make and go to all appointments, and call your doctor if your child is having problems. It's also a good idea to know your child's test results and keep a list of the medicines your child takes.  When should you call for help?   Call your doctor now or seek immediate medical care if:    Your baby has a fever over 100.4 F.     Your baby is extremely fussy or irritable, has a high-pitched cry, or refuses to eat.     Your baby does not have a wet diaper within 12 hours after the circumcision.     You find a spot of bleeding larger than a 2-inch Hamilton from the incision.     Your baby has signs of infection. Signs may include severe swelling; redness; a red streak on the shaft of the penis; or a thick, yellow discharge.   Watch closely for changes in your child's health, and be sure to contact your doctor if:    A Plastibell device was used for the circumcision and the ring has not fallen off after 10 to 12 days.   Where can you learn more?  Go to https://www.[employer].net/patiented  Enter S255 in the search box to learn more about \"Circumcision in Infants: What to Expect at Home.\"  Current as of: October 24, 2023  Content Version: 14.3    2024 [employer].   Care instructions adapted under license by your healthcare professional. If you have questions about a medical condition or this instruction, always ask your healthcare professional. [employer] disclaims any warranty or liability for your use of this " "information.    Week 37 of Your Pregnancy: Care Instructions    Most babies are born between 37 and 40 weeks.   This is a good time to pack a bag to take with you to the birth. Then it will be ready to go when you are.     Learn about breastfeeding.  For example, find out about ways to hold your baby to make breastfeeding easier. And think about learning how to pump and store milk.     Know that crying is normal.  It's common for babies to cry 1 to 3 hours a day. Some cry more, and some cry less.     Learn why babies cry.  They may be hungry; have gas; need a diaper change; or feel cold, warm, tired, lonely, or tense. Sometimes they cry for unknown reasons.     Think about what will help you stay calm when your baby cries.  Taking slow, deep breaths can help. So can taking a break. It's okay to put your baby somewhere safe (like their crib) and walk away for a few minutes.     Learn about safe sleep for your baby.  Always put your baby to sleep on their back. Place them alone in a crib or bassinet with a firm, flat surface. Keep soft items like stuffed animals out of the crib.     Learn what to expect with  poop.  Your baby will have their own bowel patterns. Some babies have several bowel movements a day. Some have fewer.     Know that  babies will often have loose, yellow bowel movements.  Formula-fed babies have more formed stools. If your baby's poop looks like pellets, your baby is constipated.   Follow-up care is a key part of your treatment and safety. Be sure to make and go to all appointments, and call your doctor if you are having problems. It's also a good idea to know your test results and keep a list of the medicines you take.  Where can you learn more?  Go to https://www.[employer].net/patiented  Enter N257 in the search box to learn more about \"Week 37 of Your Pregnancy: Care Instructions.\"  Current as of: 2024  Content Version: 14.3    2024 [facility].   Care " instructions adapted under license by your healthcare professional. If you have questions about a medical condition or this instruction, always ask your healthcare professional. [employer] disclaims any warranty or liability for your use of this information.

## 2025-02-27 NOTE — TELEPHONE ENCOUNTER
Spoke with patient. She ate 2 hrs ago and has been having frequent fetal movement ever since. Informed patient that it's normal to have fetal movement after eating. Told her to call back if continues for the next few hours and is constant and ask to speak to oncall provider.

## 2025-03-04 ENCOUNTER — ANCILLARY PROCEDURE (OUTPATIENT)
Dept: ULTRASOUND IMAGING | Facility: CLINIC | Age: 35
End: 2025-03-04
Attending: MIDWIFE
Payer: COMMERCIAL

## 2025-03-04 ENCOUNTER — LAB (OUTPATIENT)
Dept: LAB | Facility: CLINIC | Age: 35
End: 2025-03-04
Attending: ADVANCED PRACTICE MIDWIFE
Payer: COMMERCIAL

## 2025-03-04 ENCOUNTER — PRENATAL OFFICE VISIT (OUTPATIENT)
Dept: OBGYN | Facility: CLINIC | Age: 35
End: 2025-03-04
Attending: ADVANCED PRACTICE MIDWIFE
Payer: COMMERCIAL

## 2025-03-04 VITALS
WEIGHT: 128.4 LBS | SYSTOLIC BLOOD PRESSURE: 120 MMHG | HEART RATE: 88 BPM | DIASTOLIC BLOOD PRESSURE: 79 MMHG | HEIGHT: 62 IN | BODY MASS INDEX: 23.63 KG/M2

## 2025-03-04 DIAGNOSIS — O09.513 ENCOUNTER FOR SUPERVISION OF PRIMIGRAVIDA OF ADVANCED MATERNAL AGE IN THIRD TRIMESTER: Primary | ICD-10-CM

## 2025-03-04 DIAGNOSIS — O09.512 ENCOUNTER FOR SUPERVISION OF PRIMIGRAVIDA OF ADVANCED MATERNAL AGE IN SECOND TRIMESTER: ICD-10-CM

## 2025-03-04 DIAGNOSIS — O26.10 LOW MATERNAL WEIGHT GAIN, UNSPECIFIED TRIMESTER: ICD-10-CM

## 2025-03-04 DIAGNOSIS — O09.513 ENCOUNTER FOR SUPERVISION OF PRIMIGRAVIDA OF ADVANCED MATERNAL AGE IN THIRD TRIMESTER: ICD-10-CM

## 2025-03-04 LAB
ERYTHROCYTE [DISTWIDTH] IN BLOOD BY AUTOMATED COUNT: 12.8 % (ref 10–15)
HCT VFR BLD AUTO: 38.2 % (ref 35–47)
HGB BLD-MCNC: 12.9 G/DL (ref 11.7–15.7)
MCH RBC QN AUTO: 30.6 PG (ref 26.5–33)
MCHC RBC AUTO-ENTMCNC: 33.8 G/DL (ref 31.5–36.5)
MCV RBC AUTO: 91 FL (ref 78–100)
PLATELET # BLD AUTO: 258 10E3/UL (ref 150–450)
RBC # BLD AUTO: 4.21 10E6/UL (ref 3.8–5.2)
WBC # BLD AUTO: 10.3 10E3/UL (ref 4–11)

## 2025-03-04 PROCEDURE — 76816 OB US FOLLOW-UP PER FETUS: CPT | Mod: 26 | Performed by: OBSTETRICS & GYNECOLOGY

## 2025-03-04 PROCEDURE — 85027 COMPLETE CBC AUTOMATED: CPT

## 2025-03-04 PROCEDURE — 87653 STREP B DNA AMP PROBE: CPT | Performed by: ADVANCED PRACTICE MIDWIFE

## 2025-03-04 PROCEDURE — 99213 OFFICE O/P EST LOW 20 MIN: CPT | Performed by: ADVANCED PRACTICE MIDWIFE

## 2025-03-04 PROCEDURE — 76816 OB US FOLLOW-UP PER FETUS: CPT

## 2025-03-04 PROCEDURE — 36415 COLL VENOUS BLD VENIPUNCTURE: CPT

## 2025-03-04 RX ORDER — IBUPROFEN 600 MG/1
600 TABLET, FILM COATED ORAL EVERY 6 HOURS PRN
Qty: 60 TABLET | Refills: 0 | Status: SHIPPED | OUTPATIENT
Start: 2025-03-04

## 2025-03-04 RX ORDER — ACETAMINOPHEN 325 MG/1
650 TABLET ORAL EVERY 6 HOURS PRN
Qty: 100 TABLET | Refills: 0 | Status: SHIPPED | OUTPATIENT
Start: 2025-03-04

## 2025-03-04 RX ORDER — AMOXICILLIN 250 MG
1 CAPSULE ORAL DAILY
Qty: 100 TABLET | Refills: 0 | Status: SHIPPED | OUTPATIENT
Start: 2025-03-04

## 2025-03-04 NOTE — PROGRESS NOTES
"Subjective:      35 year old  at 36w4d presents for a routine prenatal appointment.    Patient concerns: Feeling well overall.  Follow up growth ultrasound today WNL- EFW 17.5%. Maternal weight gain is also now in normal range. Pt and  with questions re: epidural timing, placenta, labor, etc.     Objective:  Vitals:    25 1507   BP: 120/79   Pulse: 88   Weight: 58.2 kg (128 lb 6.4 oz)   Height: 1.575 m (5' 2\")      See OB flowsheet    Ultrasound:  Anatomy Scan:  [place] gestation.  Visualized: 4 Chamber Heart, Stomach, Kidneys, and Bladder.  Biometry:  BPD 8.7 cm 35w0d 18.4%   HC 31.2 cm 35w0d 3.0%   AC 31.7 cm 35w4d 33.7%   FL 6.6 cm 33w6d 2.7%   EFW (lbs/oz) 5 [id number]       EFW (g) 2571 g 17.5%        Cardiac activity: 141 bpm  Fetal presentation: Cephalic  Amniotic fluid: 6.6cm MVP  Placenta: Anterior , no previa, > 2 cm from internal os  Maternal Anatomy:  Right adnexa: wnl  Left adnexa: not visualized  Technique: Transabdominal Imaging performed     Impression:  Fetus with AGA growth pattern, good interval growth, normal AC.  Normal MVP.  Further studies as clinically indicated.     Assessment/Plan     Patient Active Problem List    Diagnosis Date Noted    Low maternal weight gain, unspecified trimester- growth US 2/4- 19%,  repeat growth 3/4 17.5%, wt gain now wnl 2025     Priority: Medium     11lb at 30+5, FH WNL    Growth us scheduled for   US EFW 19%  pt gained  4 # TWG      repeat growth 3/4 17.5%    Wt gain now WNL      Maternal varicella, non-immune 2024     Priority: Medium    Encounter for supervision of primigravida of advanced maternal age in second trimester 2024     Priority: Medium     [facility] CNM or MD  Partner's name:   Employment:  []Dating- LMP  [x] 1st trimester screening: MFM referral placed for 1st trimester screening place  [x]Fetal anatomy US ordered  [x] recommended LD ASA after 12 wks for PRE-E risk  [x] No increased risk for " "GDM  [x]No need for utox in labor  []COVID vaccine completed  []Pap- plan postpartum    12-23wks________________________  [x]Rubella immune  [x]Hep B immune   []Varicella  NON immune  [x]FLU shot 24    24-28wk_________________________  [x]EOB folder  [x]Labor plans: epidural  [x]Prenatal Ed  []:NO  [x]Infant feeding plan: breast  [x][place] care provider choice:[facility]   []PP Contraception plan: reviewed options   [x]TDAP   []Rhogam if needed, n/a    29-35 wk________________________  []TOLAC consent done NA  [] Water birth interest no  [x]GCT nml results  []RSV    36-37 wks______________________   [x] GBS/CBC  [x]OTC PP meds sent  [x]PP recovery plans/support: plans 12 wks off  spouse 16 wks may split   Her parents will travel from [place]  maybe 3 months  splitting between her brother who is also having a baby 1 m later   []Planning CS-ERAS pkt    38-42 wks______________________  []IOL reason/plans  []Postdates BPP        Family history of diabetes mellitus 2024     Priority: Medium     : hbg A1c 5.4  1 hour glucose today         Orders Placed This Encounter   Procedures    CBC with Platelets    Group B strep PCR       Updated individualized prenatal care plan on problem list including offering OTC postpartum meds and PP suppport plans  Labor signs discussed. Reinforced daily fetal movement counts.  Reviewed why/how to contact provider if headache/visual changes/RUQ or epigastric pain, decreased fetal movement, vaginal bleeding, leakage of fluid.    - Reviewed recommended postpartum OTC medication. Pt desires prescription. Prescription for tylenol, motrin and senna-docusate sent to pt's preferred pharmacy.  Reviewed no use of motrin during pregnancy.   Reviewed note of tylenol \"allergy.\" Pt states she has taken tylenol without concern. Had a history of taking paracetamol in [place]- when took over 650mg, she had a low bp and rash.  - Reviewed today's normal ultrasound.   - GBS obtained.  - CBC " with plts ordered.    Return to clinic in 1 week and prn if questions or concerns.     Ruby Butler, ELIZABETH, CNM

## 2025-03-05 LAB — GP B STREP DNA SPEC QL NAA+PROBE: NEGATIVE

## 2025-03-13 ENCOUNTER — PRENATAL OFFICE VISIT (OUTPATIENT)
Dept: OBGYN | Facility: CLINIC | Age: 35
End: 2025-03-13
Attending: ADVANCED PRACTICE MIDWIFE
Payer: COMMERCIAL

## 2025-03-13 VITALS
HEART RATE: 101 BPM | WEIGHT: 131.9 LBS | DIASTOLIC BLOOD PRESSURE: 78 MMHG | SYSTOLIC BLOOD PRESSURE: 115 MMHG | BODY MASS INDEX: 24.12 KG/M2

## 2025-03-13 DIAGNOSIS — O09.512 ENCOUNTER FOR SUPERVISION OF PRIMIGRAVIDA OF ADVANCED MATERNAL AGE IN SECOND TRIMESTER: Primary | ICD-10-CM

## 2025-03-13 PROCEDURE — 99213 OFFICE O/P EST LOW 20 MIN: CPT | Performed by: ADVANCED PRACTICE MIDWIFE

## 2025-03-13 NOTE — PROGRESS NOTES
Subjective:     35 year old  at 37w6d presents for routine prenatal visit.   No vaginal bleeding or leakage of fluid.  contractions or cramping.   Reports regular fetal movement.       No HA, visual changes, RUQ or epigastric pain.       Patient concerns:   Feeling tired, some nights can be hard to sleep  Baby feels low in her pelvis  Planning re-tour of birthplace since they toured early     Objective:  Vitals:    25 1630   BP: 115/78   Pulse: 101   Weight: 59.8 kg (131 lb 14.4 oz)      See OB flowsheet    Assessment/Plan  Patient Active Problem List    Diagnosis Date Noted    Low maternal weight gain, unspecified trimester- growth US /- 19%,  repeat growth 3/4 17.5%, wt gain now wnl 2025     Priority: Medium     11lb at 30+5, FH WNL    Growth us scheduled for   US EFW 19%  pt gained  4 # TWG      repeat growth 3/4 17.5%    Wt gain now WNL      Maternal varicella, non-immune 2024     Priority: Medium    Encounter for supervision of primigravida of advanced maternal age in second trimester 2024     Priority: Medium     [facility] CNM   Partner's name: Lise  Employment:  []Dating- LMP  [x] 1st trimester screening: MFM referral placed for 1st trimester screening place  [x]Fetal anatomy US ordered  [x] recommended LD ASA after 12 wks for PRE-E risk  [x] No increased risk for GDM  [x]No need for utox in labor  []COVID vaccine completed  []Pap- plan postpartum    12-23wks________________________  [x]Rubella immune  [x]Hep B immune   []Varicella  NON immune  [x]FLU shot 24    24-28wk_________________________  [x]EOB folder  [x]Labor plans: epidural  [x]Prenatal Ed  []:NO  [x]Infant feeding plan: breast  [x] care provider choice:[facility]   []PP Contraception plan: reviewed options   [x]TDAP   []Rhogam if needed, n/a    29-35 wk________________________  [x]GCT nml results  []RSV    36-37 wks______________________   [x] GBS/CBC  [x]OTC PP meds  sent  [x]PP recovery plans/support: plans 12 wks off  spouse 16 wks may split   Her parents will travel from [place]  maybe 3 months  splitting between her brother who is also having a baby 1 m later   []Planning CS-ERAS pkt    38-42 wks______________________  []IOL reason/plans  []Postdates BPP        Family history of diabetes mellitus 09/03/2024     Priority: Medium     12/13: hbg A1c 5.4  1 hour glucose today         No orders of the defined types were placed in this encounter.      Discussed plans of labor and birth, updated the individualized prenatal care plan on the problem list  - Reviewed why/how to contact provider if headache/visual changes/RUQ or epigastric pain, decreased fetal movement, vaginal bleeding, leakage of fluid or strong/regular contractions  Return to clinic in 1 week and prn if questions or concerns.   America Fuentes, APRN VALERIO

## 2025-03-13 NOTE — PATIENT INSTRUCTIONS
Thank you for trusting us with your care!   Please be aware, if you are on Mychart, you may see your results prior to your providers review. If labs are abnormal, we will call or message you on IncreaseCardt with a follow up plan.    If you need to contact us for questions about:  Symptoms, Scheduling & Medical Questions; Non-urgent (2-3 day response) Hostmonster message, Urgent (needing response today) [phone] (if after 3:30pm next day response)   Prescriptions: Please call your Pharmacy   Billing: Catherine [phone] or [facility]:[phone]

## 2025-03-18 NOTE — PROGRESS NOTES
Subjective:     35 year old  at 38w5d presents for routine prenatal visit.     Patient concerns: Feeling well overall.  Notes some pain in lower back and hip. Sometimes increased pelvic pressure.  Thinks she is having random BH ctx. Denies regular cramping/contractions, vaginal bleeding, discharge or leakage of fluid. Reports +fetal movement.  No HA, vision changes, ruq/epigastric pain.     Hoping for spontaneous labor.    Objective:  Vitals:    25 1427   BP: 129/82   Pulse: 102   Weight: 60.3 kg (132 lb 14.4 oz)      See OB flowsheet    Assessment/Plan  Patient Active Problem List    Diagnosis Date Noted    Low maternal weight gain, unspecified trimester- growth US - 19%,  repeat growth 3/4 17.5%, wt gain now wnl 2025     Priority: Medium     11lb at 30+5, FH WNL    Growth us scheduled for   US EFW 19%  pt gained  4 # TWG      repeat growth 3/4 17.5%    Wt gain now WNL      Maternal varicella, non-immune 2024     Priority: Medium    Encounter for supervision of primigravida of advanced maternal age in second trimester 2024     Priority: Medium     [facility] CNM   Partner's name: Lise  Employment:  []Dating- LMP  [x] 1st trimester screening: MFM referral placed for 1st trimester screening place  [x]Fetal anatomy US ordered  [x] recommended LD ASA after 12 wks for PRE-E risk  [x] No increased risk for GDM  [x]No need for utox in labor  []COVID vaccine completed  []Pap- plan postpartum    12-23wks________________________  [x]Rubella immune  [x]Hep B immune   []Varicella  NON immune  [x]FLU shot 24    24-28wk_________________________  [x]EOB folder  [x]Labor plans: epidural  [x]Prenatal Ed  []:NO  [x]Infant feeding plan: breast  [x][place] care provider choice:[facility]   []PP Contraception plan: reviewed options   [x]TDAP   []Rhogam if needed, n/a    29-35 wk________________________  [x]GCT nml results  []RSV    36-37 wks______________________   [x]  GBS/CBC  [x]OTC PP meds sent  [x]PP recovery plans/support: plans 12 wks off  spouse 16 wks may split   Her parents will travel from [place]  maybe 3 months  splitting between her brother who is also having a baby 1 m later   []Planning CS-ERAS pkt    38-42 wks______________________  []IOL reason/plans- prefers spont  [x]Postdates BPP- scheduled for 41 weeks-         Family history of diabetes mellitus 2024     Priority: Medium     : hbg A1c 5.4  1 hour glucose today         Orders Placed This Encounter   Procedures    US OB Biophys Prf wo NST wo Measure     [id number] -Reviewed rationale for  testing in the 41st week, specifically increased risk of stillbirth from 41 to 42 weeks (2/1,000 to 4/,000) and -Discussed that placental function can diminish the closer she gets to 42 weeks and sometimes babies do not tolerate labor as well closer to the 42nd week.  Discussed plans of labor and birth, updated the individualized prenatal care plan on the problem list  - Reviewed why/how to contact provider if headache/visual changes/RUQ or epigastric pain, decreased fetal movement, vaginal bleeding, leakage of fluid or strong/regular contractions    - Reviewed BPP vs IOL at 41 weeks. Desires BPP. Order placed.  Will schedule for 25.    Return to clinic in 1 week and prn if questions or concerns.     Ruby Butler, ELIZABETH, CNM

## 2025-03-19 ENCOUNTER — PRENATAL OFFICE VISIT (OUTPATIENT)
Dept: OBGYN | Facility: CLINIC | Age: 35
End: 2025-03-19
Attending: ADVANCED PRACTICE MIDWIFE
Payer: COMMERCIAL

## 2025-03-19 VITALS
BODY MASS INDEX: 24.31 KG/M2 | SYSTOLIC BLOOD PRESSURE: 129 MMHG | HEART RATE: 102 BPM | WEIGHT: 132.9 LBS | DIASTOLIC BLOOD PRESSURE: 82 MMHG

## 2025-03-19 DIAGNOSIS — O09.513 ENCOUNTER FOR SUPERVISION OF PRIMIGRAVIDA OF ADVANCED MATERNAL AGE IN THIRD TRIMESTER: Primary | ICD-10-CM

## 2025-03-19 PROCEDURE — 99213 OFFICE O/P EST LOW 20 MIN: CPT | Performed by: ADVANCED PRACTICE MIDWIFE

## 2025-03-19 NOTE — PATIENT INSTRUCTIONS
Thank you for trusting us with your care!   Please be aware, if you are on Mychart, you may see your results prior to your providers review. If labs are abnormal, we will call or message you on Labtript with a follow up plan.    If you need to contact us for questions about:  Symptoms, Scheduling & Medical Questions; Non-urgent (2-3 day response) FastCAP message, Urgent (needing response today) [phone] (if after 3:30pm next day response)   Prescriptions: Please call your Pharmacy   Billing: Catherine [phone] or [facility]:[phone]

## 2025-03-22 ENCOUNTER — TELEPHONE (OUTPATIENT)
Dept: OBGYN | Facility: CLINIC | Age: 35
End: 2025-03-22
Payer: COMMERCIAL

## 2025-03-23 NOTE — TELEPHONE ENCOUNTER
Page received from answering service 3/22/25 @6919 re: lost mucus plug, mild contractions. Call returned asap and spoke with Herber (pt's ). He states Yuly is downstairs with his in-laws and Herber will talk to me because they don't want to worry the in-laws. He reports she lost her mucus plug. Irregular ctx with cramping. No VB or LOF. +FM. Answered questions. Discussed scenario if hospital remains on divert and questions answered. Advised on labor and FM prec and when to call back.

## 2025-03-25 ENCOUNTER — PRENATAL OFFICE VISIT (OUTPATIENT)
Dept: OBGYN | Facility: CLINIC | Age: 35
End: 2025-03-25
Attending: ADVANCED PRACTICE MIDWIFE
Payer: COMMERCIAL

## 2025-03-25 VITALS
DIASTOLIC BLOOD PRESSURE: 84 MMHG | HEART RATE: 90 BPM | SYSTOLIC BLOOD PRESSURE: 124 MMHG | WEIGHT: 135.7 LBS | BODY MASS INDEX: 24.82 KG/M2

## 2025-03-25 DIAGNOSIS — O09.513 ENCOUNTER FOR SUPERVISION OF PRIMIGRAVIDA OF ADVANCED MATERNAL AGE IN THIRD TRIMESTER: Primary | ICD-10-CM

## 2025-03-25 PROCEDURE — 99213 OFFICE O/P EST LOW 20 MIN: CPT | Performed by: ADVANCED PRACTICE MIDWIFE

## 2025-03-25 NOTE — PROGRESS NOTES
"Subjective:     35 year old  at 39w4d presents for routine prenatal visit.     Denies cramping/contractions, vaginal bleeding, discharge or leakage of fluid.        Patient concerns: Feeling well overall.  Notes mild cramping yesterday, not consistent. Lost mucous plug on 3/22, continues to have off white \"jelly\" discharge. No vaginal bleeding or leakage of fluid.   Reports +fetal movement. No HA, vision changes, ruq/epigastric pain.     Debating IOL vs spontaneous labor. Leaning towards IOL next week. Would like cervical exam today.     Objective:  Vitals:    25 1410   BP: 124/84   Pulse: 90   Weight: 61.6 kg (135 lb 11.2 oz)      See OB flowsheet    SVE: appeared closed, unable to reach internal os d/t discomfort and low fetal station   closed/40/-1, post/med     Assessment/Plan  Patient Active Problem List    Diagnosis Date Noted    Low maternal weight gain, unspecified trimester- growth US /- 19%,  repeat growth 3/4 17.5%, wt gain now wnl 2025     Priority: Medium     11lb at 30+5, FH WNL    Growth us scheduled for   US EFW 19%  pt gained  4 # TWG      repeat growth 3/4 17.5%    Wt gain now WNL      Maternal varicella, non-immune 2024     Priority: Medium    Encounter for supervision of primigravida of advanced maternal age in second trimester 2024     Priority: Medium     [facility] CNM   Partner's name: Lise  Employment:  []Dating- LMP  [x] 1st trimester screening: M referral placed for 1st trimester screening place  [x]Fetal anatomy US ordered  [x] recommended LD ASA after 12 wks for PRE-E risk  [x] No increased risk for GDM  [x]No need for utox in labor  []COVID vaccine completed  []Pap- plan postpartum    12-23wks________________________  [x]Rubella immune  [x]Hep B immune   []Varicella  NON immune  [x]FLU shot 24    24-28wk_________________________  [x]EOB folder  [x]Labor plans: epidural  [x]Prenatal Ed  []:NO  [x]Infant feeding plan: " breast  [x] care provider choice:[facility]   []PP Contraception plan: reviewed options   [x]TDAP   []Rhogam if needed, n/a    29-35 wk________________________  [x]GCT nml results  []RSV    36-37 wks______________________   [x] GBS/CBC  [x]OTC PP meds sent  [x]PP recovery plans/support: plans 12 wks off  spouse 16 wks may split   Her parents will travel from [place]  maybe 3 months  splitting between her brother who is also having a baby 1 m later   []Planning [facility] pkt    38-42 wks______________________  []IOL reason/plans- IOL  @ 1930, has appt on 3/31, finalize plans, exam  [x]Postdates BPP- scheduled for 41 weeks- - will cancel if does  IOL        Family history of diabetes mellitus 2024     Priority: Medium     : hbg A1c 5.4  1 hour glucose today           Discussed plans of labor and birth, updated the individualized prenatal care plan on the problem list  - Reviewed why/how to contact provider if headache/visual changes/RUQ or epigastric pain, decreased fetal movement, vaginal bleeding, leakage of fluid or strong/regular contractions    - Interested in IOL. Extensive discussion.   Reviewed cervical ripening and induction methods, including po or pv misoprostol, hendricks balloon, pitocin, arom. Also discussed outpatient hendricks balloon for cervical ripening.  Discussed role of each in success of induction process- rationale, mechanism of action, r/b/a.   - Reviewed no medical indication at this time for IOL prior to 41 weeks. Discussed Safe Vaginal Birth guidelines of deferring elective IOL if cervical ripening required. Pt prefers IOL at 40+4.   - Discussed current cervical exam and calix score- would recommend PO or PV miso vs hendricks balloon d/ low fetal station, posterior cervix.     - Scheduled for IOL on 25 @ 1930, 40+4.   - Scheduled for [facility] appointment on 3/31/25- confirm induction plan, consider cervical exam. (Does have BPP/RAH scheduled on , will need to cancel  prn)    Ruby Butler, APRN, CNM

## 2025-03-25 NOTE — PATIENT INSTRUCTIONS
Thank you for trusting us with your care!   Please be aware, if you are on Mychart, you may see your results prior to your providers review. If labs are abnormal, we will call or message you on [employer] with a follow up plan.    If you need to contact us for questions about:  Symptoms, Scheduling & Medical Questions; Non-urgent (2-3 day response) [facility] message, Urgent (needing response today) [phone] (if after 3:30pm next day response)   Prescriptions: Please call your Pharmacy   Billing: Catherine [phone] or [facility]:[phone]